# Patient Record
Sex: FEMALE | Race: BLACK OR AFRICAN AMERICAN | Employment: UNEMPLOYED | URBAN - METROPOLITAN AREA
[De-identification: names, ages, dates, MRNs, and addresses within clinical notes are randomized per-mention and may not be internally consistent; named-entity substitution may affect disease eponyms.]

---

## 2017-01-20 RX ORDER — HYDROCHLOROTHIAZIDE 25 MG/1
TABLET ORAL
Qty: 90 TABLET | Refills: 3 | Status: SHIPPED | OUTPATIENT
Start: 2017-01-20 | End: 2018-02-21 | Stop reason: SDUPTHER

## 2017-01-20 RX ORDER — TOPIRAMATE 50 MG/1
TABLET, FILM COATED ORAL
Qty: 180 TABLET | Refills: 3 | Status: SHIPPED | OUTPATIENT
Start: 2017-01-20 | End: 2017-03-29

## 2017-03-29 ENCOUNTER — OFFICE VISIT (OUTPATIENT)
Dept: PRIMARY CARE CLINIC | Age: 67
End: 2017-03-29

## 2017-03-29 VITALS
HEART RATE: 79 BPM | OXYGEN SATURATION: 95 % | BODY MASS INDEX: 59.91 KG/M2 | TEMPERATURE: 98.3 F | RESPIRATION RATE: 18 BRPM | WEIGHT: 293 LBS | DIASTOLIC BLOOD PRESSURE: 84 MMHG | SYSTOLIC BLOOD PRESSURE: 143 MMHG

## 2017-03-29 DIAGNOSIS — J45.901 ASTHMA WITH ACUTE EXACERBATION, UNSPECIFIED ASTHMA SEVERITY: Primary | ICD-10-CM

## 2017-03-29 DIAGNOSIS — E55.9 VITAMIN D DEFICIENCY: ICD-10-CM

## 2017-03-29 DIAGNOSIS — R06.02 SHORTNESS OF BREATH: ICD-10-CM

## 2017-03-29 DIAGNOSIS — E53.8 VITAMIN B 12 DEFICIENCY: ICD-10-CM

## 2017-03-29 DIAGNOSIS — E78.2 MIXED HYPERLIPIDEMIA: ICD-10-CM

## 2017-03-29 DIAGNOSIS — R73.03 PREDIABETES: ICD-10-CM

## 2017-03-29 DIAGNOSIS — M47.16 OSTEOARTHRITIS OF LUMBAR SPINE WITH MYELOPATHY: ICD-10-CM

## 2017-03-29 DIAGNOSIS — E66.01 MORBID OBESITY WITH BMI OF 50.0-59.9, ADULT (HCC): ICD-10-CM

## 2017-03-29 DIAGNOSIS — I50.32 CHRONIC DIASTOLIC CONGESTIVE HEART FAILURE (HCC): ICD-10-CM

## 2017-03-29 DIAGNOSIS — R73.9 HYPERGLYCEMIA: ICD-10-CM

## 2017-03-29 DIAGNOSIS — D53.8 ANEMIA DUE TO VITAMIN A DEFICIENCY: ICD-10-CM

## 2017-03-29 DIAGNOSIS — J20.9 ACUTE BRONCHITIS, UNSPECIFIED ORGANISM: ICD-10-CM

## 2017-03-29 DIAGNOSIS — E50.8 ANEMIA DUE TO VITAMIN A DEFICIENCY: ICD-10-CM

## 2017-03-29 LAB
A/G RATIO: 1.2 (ref 1.1–2.2)
ALBUMIN SERPL-MCNC: 4 G/DL (ref 3.4–5)
ALP BLD-CCNC: 97 U/L (ref 40–129)
ALT SERPL-CCNC: 25 U/L (ref 10–40)
ANION GAP SERPL CALCULATED.3IONS-SCNC: 17 MMOL/L (ref 3–16)
AST SERPL-CCNC: 19 U/L (ref 15–37)
BILIRUB SERPL-MCNC: <0.2 MG/DL (ref 0–1)
BUN BLDV-MCNC: 11 MG/DL (ref 7–20)
CALCIUM SERPL-MCNC: 9.6 MG/DL (ref 8.3–10.6)
CHLORIDE BLD-SCNC: 96 MMOL/L (ref 99–110)
CHOLESTEROL, TOTAL: 208 MG/DL (ref 0–199)
CO2: 28 MMOL/L (ref 21–32)
CREAT SERPL-MCNC: 0.6 MG/DL (ref 0.6–1.2)
GFR AFRICAN AMERICAN: >60
GFR NON-AFRICAN AMERICAN: >60
GLOBULIN: 3.3 G/DL
GLUCOSE BLD-MCNC: 140 MG/DL (ref 70–99)
HDLC SERPL-MCNC: 42 MG/DL (ref 40–60)
LDL CHOLESTEROL CALCULATED: 154 MG/DL
POTASSIUM SERPL-SCNC: 4.2 MMOL/L (ref 3.5–5.1)
PRO-BNP: 68 PG/ML (ref 0–124)
SODIUM BLD-SCNC: 141 MMOL/L (ref 136–145)
TOTAL PROTEIN: 7.3 G/DL (ref 6.4–8.2)
TRIGL SERPL-MCNC: 61 MG/DL (ref 0–150)
TSH REFLEX FT4: 0.95 UIU/ML (ref 0.27–4.2)
VITAMIN B-12: 440 PG/ML (ref 211–911)
VITAMIN D 25-HYDROXY: 22.6 NG/ML
VLDLC SERPL CALC-MCNC: 12 MG/DL

## 2017-03-29 PROCEDURE — 99214 OFFICE O/P EST MOD 30 MIN: CPT | Performed by: INTERNAL MEDICINE

## 2017-03-29 RX ORDER — METFORMIN HYDROCHLORIDE 500 MG/1
500 TABLET, EXTENDED RELEASE ORAL 2 TIMES DAILY
Qty: 180 TABLET | Refills: 3 | Status: SHIPPED | OUTPATIENT
Start: 2017-03-29 | End: 2017-12-22 | Stop reason: SDUPTHER

## 2017-03-29 RX ORDER — VITAMIN B COMPLEX
1 TABLET ORAL DAILY
Qty: 90 TABLET | Refills: 4 | Status: SHIPPED | OUTPATIENT
Start: 2017-03-29 | End: 2022-04-26 | Stop reason: ALTCHOICE

## 2017-03-29 RX ORDER — MONTELUKAST SODIUM 10 MG/1
10 TABLET ORAL NIGHTLY
Qty: 90 TABLET | Refills: 4 | Status: SHIPPED | OUTPATIENT
Start: 2017-03-29 | End: 2017-06-02 | Stop reason: SDUPTHER

## 2017-03-29 RX ORDER — BUDESONIDE AND FORMOTEROL FUMARATE DIHYDRATE 160; 4.5 UG/1; UG/1
2 AEROSOL RESPIRATORY (INHALATION) 2 TIMES DAILY
Qty: 3 INHALER | Refills: 4 | Status: SHIPPED | OUTPATIENT
Start: 2017-03-29 | End: 2019-02-06 | Stop reason: SDUPTHER

## 2017-03-29 RX ORDER — BENZONATATE 100 MG/1
100 CAPSULE ORAL 3 TIMES DAILY PRN
COMMUNITY
End: 2020-05-11

## 2017-03-29 RX ORDER — FLUTICASONE PROPIONATE 50 MCG
1 SPRAY, SUSPENSION (ML) NASAL DAILY
Qty: 3 BOTTLE | Refills: 3 | Status: SHIPPED | OUTPATIENT
Start: 2017-03-29 | End: 2020-09-14

## 2017-03-29 RX ORDER — PREDNISONE 10 MG/1
10 TABLET ORAL DAILY
COMMUNITY
End: 2018-09-07 | Stop reason: SDUPTHER

## 2017-03-29 RX ORDER — AZITHROMYCIN 250 MG/1
250 TABLET, FILM COATED ORAL DAILY
Qty: 10 TABLET | Refills: 0 | Status: SHIPPED | OUTPATIENT
Start: 2017-03-29 | End: 2017-04-08

## 2017-03-29 RX ORDER — ALBUTEROL SULFATE 90 UG/1
2 AEROSOL, METERED RESPIRATORY (INHALATION) EVERY 6 HOURS PRN
Qty: 1 INHALER | Refills: 3 | Status: SHIPPED | OUTPATIENT
Start: 2017-03-29 | End: 2018-09-06 | Stop reason: SDUPTHER

## 2017-03-29 RX ORDER — METHYLPREDNISOLONE 4 MG/1
TABLET ORAL
Qty: 1 KIT | Refills: 0 | Status: SHIPPED | OUTPATIENT
Start: 2017-03-29 | End: 2017-04-04

## 2017-03-30 LAB
ESTIMATED AVERAGE GLUCOSE: 137 MG/DL
HBA1C MFR BLD: 6.4 %

## 2017-04-04 DIAGNOSIS — E78.2 MIXED HYPERLIPIDEMIA: ICD-10-CM

## 2017-04-04 RX ORDER — ROSUVASTATIN CALCIUM 40 MG/1
40 TABLET, COATED ORAL NIGHTLY
Qty: 30 TABLET | Refills: 5 | Status: SHIPPED | OUTPATIENT
Start: 2017-04-04 | End: 2018-09-06 | Stop reason: SDUPTHER

## 2017-04-04 ASSESSMENT — ENCOUNTER SYMPTOMS
EYE PAIN: 0
BLOOD IN STOOL: 0
CHEST TIGHTNESS: 0
DIARRHEA: 0
ABDOMINAL DISTENTION: 0
ABDOMINAL PAIN: 0
VOMITING: 0
WHEEZING: 0
COUGH: 0
EYE DISCHARGE: 0
RECTAL PAIN: 0
CHOKING: 0
NAUSEA: 0
COLOR CHANGE: 0
SINUS PRESSURE: 0
SHORTNESS OF BREATH: 0
RHINORRHEA: 0
EYE ITCHING: 0
ANAL BLEEDING: 0
APNEA: 0
CONSTIPATION: 0
BACK PAIN: 1
PHOTOPHOBIA: 0
STRIDOR: 0
FACIAL SWELLING: 0
EYE REDNESS: 0

## 2017-04-06 ENCOUNTER — OFFICE VISIT (OUTPATIENT)
Dept: PRIMARY CARE CLINIC | Age: 67
End: 2017-04-06

## 2017-04-06 VITALS
BODY MASS INDEX: 48.82 KG/M2 | RESPIRATION RATE: 20 BRPM | WEIGHT: 293 LBS | TEMPERATURE: 99.4 F | OXYGEN SATURATION: 92 % | DIASTOLIC BLOOD PRESSURE: 65 MMHG | HEART RATE: 69 BPM | HEIGHT: 65 IN | SYSTOLIC BLOOD PRESSURE: 125 MMHG

## 2017-04-06 DIAGNOSIS — E78.2 MIXED HYPERLIPIDEMIA: ICD-10-CM

## 2017-04-06 DIAGNOSIS — E55.9 VITAMIN D DEFICIENCY: ICD-10-CM

## 2017-04-06 DIAGNOSIS — J45.909 ACUTE ASTHMA: ICD-10-CM

## 2017-04-06 DIAGNOSIS — B37.0 THRUSH: Primary | ICD-10-CM

## 2017-04-06 PROCEDURE — 99213 OFFICE O/P EST LOW 20 MIN: CPT | Performed by: INTERNAL MEDICINE

## 2017-04-06 ASSESSMENT — PATIENT HEALTH QUESTIONNAIRE - PHQ9
2. FEELING DOWN, DEPRESSED OR HOPELESS: 0
SUM OF ALL RESPONSES TO PHQ9 QUESTIONS 1 & 2: 0
1. LITTLE INTEREST OR PLEASURE IN DOING THINGS: 0
SUM OF ALL RESPONSES TO PHQ QUESTIONS 1-9: 0

## 2017-04-10 ASSESSMENT — ENCOUNTER SYMPTOMS
SHORTNESS OF BREATH: 0
BLOOD IN STOOL: 0
CONSTIPATION: 0
ANAL BLEEDING: 0
EYE PAIN: 0
NAUSEA: 0
DIARRHEA: 0
VOMITING: 0
RHINORRHEA: 0
FACIAL SWELLING: 0
ABDOMINAL PAIN: 0
CHOKING: 0
COUGH: 0
EYE REDNESS: 0
RECTAL PAIN: 0
STRIDOR: 0
PHOTOPHOBIA: 0
APNEA: 0
COLOR CHANGE: 0
EYE ITCHING: 0
WHEEZING: 0
EYE DISCHARGE: 0
SINUS PRESSURE: 0
ABDOMINAL DISTENTION: 0
CHEST TIGHTNESS: 0
BACK PAIN: 1

## 2017-06-02 DIAGNOSIS — I11.9 MALIGNANT HTN WITH HEART DISEASE, W/O CHF, W/O CHRONIC KIDNEY DISEASE: ICD-10-CM

## 2017-06-04 RX ORDER — LOSARTAN POTASSIUM 100 MG/1
TABLET ORAL
Qty: 90 TABLET | Refills: 4 | Status: SHIPPED | OUTPATIENT
Start: 2017-06-04 | End: 2017-07-10 | Stop reason: SDUPTHER

## 2017-07-05 ENCOUNTER — TELEPHONE (OUTPATIENT)
Dept: PRIMARY CARE CLINIC | Age: 67
End: 2017-07-05

## 2017-07-07 ENCOUNTER — TELEPHONE (OUTPATIENT)
Dept: PRIMARY CARE CLINIC | Age: 67
End: 2017-07-07

## 2017-07-10 ENCOUNTER — TELEPHONE (OUTPATIENT)
Dept: PRIMARY CARE CLINIC | Age: 67
End: 2017-07-10

## 2017-07-10 ENCOUNTER — OFFICE VISIT (OUTPATIENT)
Dept: PRIMARY CARE CLINIC | Age: 67
End: 2017-07-10

## 2017-07-10 VITALS
HEIGHT: 65 IN | HEART RATE: 82 BPM | DIASTOLIC BLOOD PRESSURE: 60 MMHG | RESPIRATION RATE: 18 BRPM | BODY MASS INDEX: 48.82 KG/M2 | OXYGEN SATURATION: 93 % | WEIGHT: 293 LBS | SYSTOLIC BLOOD PRESSURE: 110 MMHG

## 2017-07-10 DIAGNOSIS — E55.9 VITAMIN D DEFICIENCY: ICD-10-CM

## 2017-07-10 DIAGNOSIS — I89.0 LYMPHEDEMA: ICD-10-CM

## 2017-07-10 DIAGNOSIS — E78.2 MIXED HYPERLIPIDEMIA: ICD-10-CM

## 2017-07-10 DIAGNOSIS — R73.9 HYPERGLYCEMIA: ICD-10-CM

## 2017-07-10 DIAGNOSIS — M17.10 ARTHRITIS OF KNEE: Primary | ICD-10-CM

## 2017-07-10 DIAGNOSIS — I11.9 MALIGNANT HTN WITH HEART DISEASE, W/O CHF, W/O CHRONIC KIDNEY DISEASE: ICD-10-CM

## 2017-07-10 LAB
A/G RATIO: 1.4 (ref 1.1–2.2)
ALBUMIN SERPL-MCNC: 4.4 G/DL (ref 3.4–5)
ALP BLD-CCNC: 77 U/L (ref 40–129)
ALT SERPL-CCNC: 18 U/L (ref 10–40)
ANION GAP SERPL CALCULATED.3IONS-SCNC: 13 MMOL/L (ref 3–16)
AST SERPL-CCNC: 15 U/L (ref 15–37)
BASOPHILS ABSOLUTE: 0.1 K/UL (ref 0–0.2)
BASOPHILS RELATIVE PERCENT: 0.7 %
BILIRUB SERPL-MCNC: 0.3 MG/DL (ref 0–1)
BILIRUBIN URINE: NEGATIVE
BLOOD, URINE: NEGATIVE
BUN BLDV-MCNC: 12 MG/DL (ref 7–20)
CALCIUM SERPL-MCNC: 10 MG/DL (ref 8.3–10.6)
CHLORIDE BLD-SCNC: 99 MMOL/L (ref 99–110)
CLARITY: CLEAR
CO2: 31 MMOL/L (ref 21–32)
COLOR: YELLOW
CREAT SERPL-MCNC: 0.6 MG/DL (ref 0.6–1.2)
CREATININE URINE: 104.7 MG/DL (ref 28–259)
EOSINOPHILS ABSOLUTE: 0.2 K/UL (ref 0–0.6)
EOSINOPHILS RELATIVE PERCENT: 2.1 %
GFR AFRICAN AMERICAN: >60
GFR NON-AFRICAN AMERICAN: >60
GLOBULIN: 3.1 G/DL
GLUCOSE BLD-MCNC: 122 MG/DL (ref 70–99)
GLUCOSE URINE: NEGATIVE MG/DL
HCT VFR BLD CALC: 45.9 % (ref 36–48)
HEMOGLOBIN: 14.9 G/DL (ref 12–16)
KETONES, URINE: NEGATIVE MG/DL
LEUKOCYTE ESTERASE, URINE: NEGATIVE
LYMPHOCYTES ABSOLUTE: 2.8 K/UL (ref 1–5.1)
LYMPHOCYTES RELATIVE PERCENT: 34.6 %
MCH RBC QN AUTO: 29.6 PG (ref 26–34)
MCHC RBC AUTO-ENTMCNC: 32.4 G/DL (ref 31–36)
MCV RBC AUTO: 91.2 FL (ref 80–100)
MICROALBUMIN UR-MCNC: <1.2 MG/DL
MICROALBUMIN/CREAT UR-RTO: NORMAL MG/G (ref 0–30)
MICROSCOPIC EXAMINATION: NORMAL
MONOCYTES ABSOLUTE: 0.7 K/UL (ref 0–1.3)
MONOCYTES RELATIVE PERCENT: 8.3 %
NEUTROPHILS ABSOLUTE: 4.4 K/UL (ref 1.7–7.7)
NEUTROPHILS RELATIVE PERCENT: 54.3 %
NITRITE, URINE: NEGATIVE
PDW BLD-RTO: 13.8 % (ref 12.4–15.4)
PH UA: 7
PLATELET # BLD: 290 K/UL (ref 135–450)
PMV BLD AUTO: 9.2 FL (ref 5–10.5)
POTASSIUM SERPL-SCNC: 4.2 MMOL/L (ref 3.5–5.1)
PROTEIN UA: NEGATIVE MG/DL
RBC # BLD: 5.03 M/UL (ref 4–5.2)
SODIUM BLD-SCNC: 143 MMOL/L (ref 136–145)
SPECIFIC GRAVITY UA: 1.02
TOTAL PROTEIN: 7.5 G/DL (ref 6.4–8.2)
URINE TYPE: NORMAL
UROBILINOGEN, URINE: 0.2 E.U./DL
WBC # BLD: 8.1 K/UL (ref 4–11)

## 2017-07-10 PROCEDURE — 99214 OFFICE O/P EST MOD 30 MIN: CPT | Performed by: INTERNAL MEDICINE

## 2017-07-10 RX ORDER — DULOXETIN HYDROCHLORIDE 30 MG/1
30 CAPSULE, DELAYED RELEASE ORAL DAILY
Qty: 30 CAPSULE | Refills: 3 | Status: SHIPPED | OUTPATIENT
Start: 2017-07-10 | End: 2017-12-27 | Stop reason: SDUPTHER

## 2017-07-10 RX ORDER — LOSARTAN POTASSIUM 100 MG/1
TABLET ORAL
Qty: 90 TABLET | Refills: 4 | Status: SHIPPED | OUTPATIENT
Start: 2017-07-10 | End: 2018-09-06 | Stop reason: SDUPTHER

## 2017-07-11 LAB
ESTIMATED AVERAGE GLUCOSE: 139.9 MG/DL
HBA1C MFR BLD: 6.5 %
TSH REFLEX FT4: 1.54 UIU/ML (ref 0.27–4.2)
VITAMIN D 25-HYDROXY: 32.7 NG/ML

## 2017-07-11 ASSESSMENT — ENCOUNTER SYMPTOMS
ANAL BLEEDING: 0
WHEEZING: 0
CONSTIPATION: 0
RECTAL PAIN: 0
FACIAL SWELLING: 0
EYE REDNESS: 0
PHOTOPHOBIA: 0
STRIDOR: 0
ABDOMINAL DISTENTION: 0
COLOR CHANGE: 0
BLOOD IN STOOL: 0
NAUSEA: 0
VOMITING: 0
CHEST TIGHTNESS: 0
RHINORRHEA: 0
BACK PAIN: 1
APNEA: 0
EYE ITCHING: 0
COUGH: 0
ABDOMINAL PAIN: 0
DIARRHEA: 0
EYE DISCHARGE: 0
SINUS PRESSURE: 0
CHOKING: 0
EYE PAIN: 0
SHORTNESS OF BREATH: 0

## 2017-12-22 DIAGNOSIS — R73.03 PREDIABETES: ICD-10-CM

## 2017-12-23 RX ORDER — METFORMIN HYDROCHLORIDE 500 MG/1
TABLET, EXTENDED RELEASE ORAL
Qty: 180 TABLET | Refills: 1 | Status: SHIPPED | OUTPATIENT
Start: 2017-12-23 | End: 2018-09-06 | Stop reason: SDUPTHER

## 2018-02-16 DIAGNOSIS — M17.10 ARTHRITIS OF KNEE: ICD-10-CM

## 2018-02-16 NOTE — TELEPHONE ENCOUNTER
Requested Prescriptions     Pending Prescriptions Disp Refills    DULoxetine (CYMBALTA) 30 MG extended release capsule [Pharmacy Med Name: DULOXETINE DR 30MG CAPSULES] 90 capsule 5     Sig: TAKE 1 CAPSULE BY MOUTH DAILY TO LESSEN ARTHIRITIS PAIN     Last Fill Date:  12/28/2017  R:5  Last OV: 7/10/17

## 2018-02-21 RX ORDER — DULOXETIN HYDROCHLORIDE 30 MG/1
CAPSULE, DELAYED RELEASE ORAL
Qty: 90 CAPSULE | Refills: 5 | Status: SHIPPED | OUTPATIENT
Start: 2018-02-21 | End: 2018-09-06 | Stop reason: SDUPTHER

## 2018-02-22 RX ORDER — HYDROCHLOROTHIAZIDE 25 MG/1
TABLET ORAL
Qty: 90 TABLET | Refills: 3 | Status: SHIPPED | OUTPATIENT
Start: 2018-02-22 | End: 2019-02-06 | Stop reason: SDUPTHER

## 2018-09-06 DIAGNOSIS — M17.10 ARTHRITIS OF KNEE: ICD-10-CM

## 2018-09-06 DIAGNOSIS — R73.03 PREDIABETES: ICD-10-CM

## 2018-09-06 DIAGNOSIS — E78.2 MIXED HYPERLIPIDEMIA: ICD-10-CM

## 2018-09-06 DIAGNOSIS — J45.40 MODERATE PERSISTENT ASTHMA WITHOUT COMPLICATION: ICD-10-CM

## 2018-09-06 DIAGNOSIS — I11.9 MALIGNANT HTN WITH HEART DISEASE, W/O CHF, W/O CHRONIC KIDNEY DISEASE: ICD-10-CM

## 2018-09-06 RX ORDER — ALBUTEROL SULFATE 90 UG/1
2 AEROSOL, METERED RESPIRATORY (INHALATION) EVERY 6 HOURS PRN
Qty: 1 INHALER | Refills: 3 | Status: SHIPPED | OUTPATIENT
Start: 2018-09-06 | End: 2018-09-07 | Stop reason: SDUPTHER

## 2018-09-06 RX ORDER — AMLODIPINE BESYLATE 10 MG/1
TABLET ORAL
Qty: 90 TABLET | Refills: 4 | Status: SHIPPED | OUTPATIENT
Start: 2018-09-06 | End: 2019-02-06 | Stop reason: SDUPTHER

## 2018-09-06 RX ORDER — NITROGLYCERIN 0.4 MG/1
0.4 TABLET SUBLINGUAL EVERY 5 MIN PRN
Qty: 25 TABLET | Refills: 3 | Status: SHIPPED | OUTPATIENT
Start: 2018-09-06 | End: 2018-09-07 | Stop reason: SDUPTHER

## 2018-09-06 RX ORDER — DULOXETIN HYDROCHLORIDE 30 MG/1
CAPSULE, DELAYED RELEASE ORAL
Qty: 90 CAPSULE | Refills: 5 | Status: SHIPPED | OUTPATIENT
Start: 2018-09-06 | End: 2019-05-13

## 2018-09-06 RX ORDER — LOSARTAN POTASSIUM 100 MG/1
TABLET ORAL
Qty: 90 TABLET | Refills: 4 | Status: SHIPPED | OUTPATIENT
Start: 2018-09-06 | End: 2019-02-06 | Stop reason: SDUPTHER

## 2018-09-06 RX ORDER — METFORMIN HYDROCHLORIDE 500 MG/1
TABLET, EXTENDED RELEASE ORAL
Qty: 180 TABLET | Refills: 1 | Status: SHIPPED | OUTPATIENT
Start: 2018-09-06 | End: 2019-02-06 | Stop reason: SDUPTHER

## 2018-09-06 RX ORDER — ROSUVASTATIN CALCIUM 40 MG/1
40 TABLET, COATED ORAL NIGHTLY
Qty: 30 TABLET | Refills: 5 | Status: SHIPPED | OUTPATIENT
Start: 2018-09-06 | End: 2019-02-06 | Stop reason: SDUPTHER

## 2018-09-07 DIAGNOSIS — R07.9 CHEST PAIN: ICD-10-CM

## 2018-09-07 DIAGNOSIS — I11.9 MALIGNANT HTN WITH HEART DISEASE, W/O CHF, W/O CHRONIC KIDNEY DISEASE: ICD-10-CM

## 2018-09-07 DIAGNOSIS — J45.40 MODERATE PERSISTENT ASTHMA: ICD-10-CM

## 2018-09-07 DIAGNOSIS — J45.40 MODERATE PERSISTENT ASTHMA WITHOUT COMPLICATION: ICD-10-CM

## 2018-09-07 DIAGNOSIS — E78.2 MIXED HYPERLIPIDEMIA: ICD-10-CM

## 2018-09-07 DIAGNOSIS — R73.03 PREDIABETES: ICD-10-CM

## 2018-09-07 DIAGNOSIS — M17.10 ARTHRITIS OF KNEE: ICD-10-CM

## 2018-09-08 RX ORDER — LOSARTAN POTASSIUM 100 MG/1
TABLET ORAL
Qty: 90 TABLET | Refills: 4 | OUTPATIENT
Start: 2018-09-08

## 2018-09-08 RX ORDER — ALBUTEROL SULFATE 90 UG/1
2 AEROSOL, METERED RESPIRATORY (INHALATION) EVERY 6 HOURS PRN
Qty: 1 INHALER | Refills: 3 | OUTPATIENT
Start: 2018-09-08

## 2018-09-08 RX ORDER — NITROGLYCERIN 0.4 MG/1
0.4 TABLET SUBLINGUAL EVERY 5 MIN PRN
Qty: 25 TABLET | Refills: 3 | Status: SHIPPED | OUTPATIENT
Start: 2018-09-08 | End: 2018-09-08

## 2018-09-08 RX ORDER — DULOXETIN HYDROCHLORIDE 30 MG/1
30 CAPSULE, DELAYED RELEASE ORAL DAILY
Qty: 90 CAPSULE | Refills: 0 | OUTPATIENT
Start: 2018-09-08

## 2018-09-08 RX ORDER — NITROGLYCERIN 0.4 MG/1
0.4 TABLET SUBLINGUAL EVERY 5 MIN PRN
Qty: 25 TABLET | Refills: 3 | Status: SHIPPED | OUTPATIENT
Start: 2018-09-08 | End: 2018-09-11

## 2018-09-08 RX ORDER — AMLODIPINE BESYLATE 10 MG/1
10 TABLET ORAL DAILY
Qty: 90 TABLET | Refills: 4 | OUTPATIENT
Start: 2018-09-08

## 2018-09-08 RX ORDER — ROSUVASTATIN CALCIUM 40 MG/1
40 TABLET, COATED ORAL NIGHTLY
Qty: 30 TABLET | Refills: 5 | OUTPATIENT
Start: 2018-09-08

## 2018-09-08 RX ORDER — ALBUTEROL SULFATE 90 UG/1
2 AEROSOL, METERED RESPIRATORY (INHALATION) EVERY 6 HOURS PRN
Qty: 1 INHALER | Refills: 3 | Status: SHIPPED | OUTPATIENT
Start: 2018-09-08 | End: 2020-05-11 | Stop reason: SDUPTHER

## 2018-09-08 RX ORDER — ALBUTEROL SULFATE 90 UG/1
2 AEROSOL, METERED RESPIRATORY (INHALATION) EVERY 6 HOURS PRN
Qty: 1 INHALER | Refills: 3 | Status: SHIPPED | OUTPATIENT
Start: 2018-09-08 | End: 2020-09-15

## 2018-09-08 RX ORDER — DULOXETIN HYDROCHLORIDE 30 MG/1
CAPSULE, DELAYED RELEASE ORAL
Qty: 90 CAPSULE | Refills: 5 | OUTPATIENT
Start: 2018-09-08

## 2018-09-08 RX ORDER — METFORMIN HYDROCHLORIDE 500 MG/1
TABLET, EXTENDED RELEASE ORAL
Qty: 180 TABLET | Refills: 1 | OUTPATIENT
Start: 2018-09-08

## 2018-09-08 RX ORDER — PREDNISONE 10 MG/1
10 TABLET ORAL DAILY
Qty: 1 TABLET | Refills: 5 | Status: SHIPPED | OUTPATIENT
Start: 2018-09-08 | End: 2019-02-06

## 2018-09-11 RX ORDER — NITROGLYCERIN 0.4 MG/1
TABLET SUBLINGUAL
Qty: 75 TABLET | Refills: 1 | Status: SHIPPED | OUTPATIENT
Start: 2018-09-11 | End: 2021-08-17

## 2019-01-28 ENCOUNTER — TELEPHONE (OUTPATIENT)
Dept: PRIMARY CARE CLINIC | Age: 69
End: 2019-01-28

## 2019-02-06 ENCOUNTER — OFFICE VISIT (OUTPATIENT)
Dept: PRIMARY CARE CLINIC | Age: 69
End: 2019-02-06
Payer: MEDICARE

## 2019-02-06 VITALS
DIASTOLIC BLOOD PRESSURE: 96 MMHG | BODY MASS INDEX: 58.24 KG/M2 | OXYGEN SATURATION: 91 % | WEIGHT: 293 LBS | RESPIRATION RATE: 18 BRPM | SYSTOLIC BLOOD PRESSURE: 160 MMHG | TEMPERATURE: 97.8 F | HEART RATE: 77 BPM

## 2019-02-06 DIAGNOSIS — E78.2 MIXED HYPERLIPIDEMIA: ICD-10-CM

## 2019-02-06 DIAGNOSIS — R73.01 ELEVATED FASTING GLUCOSE: ICD-10-CM

## 2019-02-06 DIAGNOSIS — R73.03 PREDIABETES: ICD-10-CM

## 2019-02-06 DIAGNOSIS — J45.41 MODERATE PERSISTENT ASTHMA WITH ACUTE EXACERBATION: Primary | ICD-10-CM

## 2019-02-06 DIAGNOSIS — R53.83 OTHER FATIGUE: ICD-10-CM

## 2019-02-06 DIAGNOSIS — R06.09 DOE (DYSPNEA ON EXERTION): ICD-10-CM

## 2019-02-06 DIAGNOSIS — I11.9 MALIGNANT HTN WITH HEART DISEASE, W/O CHF, W/O CHRONIC KIDNEY DISEASE: ICD-10-CM

## 2019-02-06 DIAGNOSIS — Z12.11 COLON CANCER SCREENING: ICD-10-CM

## 2019-02-06 DIAGNOSIS — R63.4 WEIGHT LOSS: ICD-10-CM

## 2019-02-06 DIAGNOSIS — Z12.31 ENCOUNTER FOR SCREENING MAMMOGRAM FOR BREAST CANCER: ICD-10-CM

## 2019-02-06 DIAGNOSIS — E55.9 VITAMIN D DEFICIENCY: ICD-10-CM

## 2019-02-06 DIAGNOSIS — M25.511 CHRONIC RIGHT SHOULDER PAIN: ICD-10-CM

## 2019-02-06 DIAGNOSIS — G89.29 CHRONIC RIGHT SHOULDER PAIN: ICD-10-CM

## 2019-02-06 DIAGNOSIS — J45.41 MODERATE PERSISTENT ASTHMA WITH ACUTE EXACERBATION: ICD-10-CM

## 2019-02-06 LAB
BILIRUBIN URINE: NEGATIVE
BLOOD, URINE: NEGATIVE
CHOLESTEROL, TOTAL: 271 MG/DL (ref 0–199)
CLARITY: CLEAR
COLOR: YELLOW
GLUCOSE URINE: NEGATIVE MG/DL
HDLC SERPL-MCNC: 44 MG/DL (ref 40–60)
KETONES, URINE: NEGATIVE MG/DL
LDL CHOLESTEROL CALCULATED: 206 MG/DL
LEUKOCYTE ESTERASE, URINE: NEGATIVE
LIPASE: 19 U/L (ref 13–60)
MICROSCOPIC EXAMINATION: NORMAL
NITRITE, URINE: NEGATIVE
PH UA: 6
PROTEIN UA: NEGATIVE MG/DL
SPECIFIC GRAVITY UA: 1.02
TRIGL SERPL-MCNC: 107 MG/DL (ref 0–150)
TSH REFLEX FT4: 2.12 UIU/ML (ref 0.27–4.2)
URINE TYPE: NORMAL
UROBILINOGEN, URINE: 0.2 E.U./DL
VITAMIN B-12: 367 PG/ML (ref 211–911)
VITAMIN D 25-HYDROXY: 28.9 NG/ML
VLDLC SERPL CALC-MCNC: 21 MG/DL

## 2019-02-06 PROCEDURE — G8400 PT W/DXA NO RESULTS DOC: HCPCS | Performed by: INTERNAL MEDICINE

## 2019-02-06 PROCEDURE — 1036F TOBACCO NON-USER: CPT | Performed by: INTERNAL MEDICINE

## 2019-02-06 PROCEDURE — 4040F PNEUMOC VAC/ADMIN/RCVD: CPT | Performed by: INTERNAL MEDICINE

## 2019-02-06 PROCEDURE — 3017F COLORECTAL CA SCREEN DOC REV: CPT | Performed by: INTERNAL MEDICINE

## 2019-02-06 PROCEDURE — G8484 FLU IMMUNIZE NO ADMIN: HCPCS | Performed by: INTERNAL MEDICINE

## 2019-02-06 PROCEDURE — G8417 CALC BMI ABV UP PARAM F/U: HCPCS | Performed by: INTERNAL MEDICINE

## 2019-02-06 PROCEDURE — 1090F PRES/ABSN URINE INCON ASSESS: CPT | Performed by: INTERNAL MEDICINE

## 2019-02-06 PROCEDURE — G8427 DOCREV CUR MEDS BY ELIG CLIN: HCPCS | Performed by: INTERNAL MEDICINE

## 2019-02-06 PROCEDURE — 99214 OFFICE O/P EST MOD 30 MIN: CPT | Performed by: INTERNAL MEDICINE

## 2019-02-06 PROCEDURE — 1101F PT FALLS ASSESS-DOCD LE1/YR: CPT | Performed by: INTERNAL MEDICINE

## 2019-02-06 PROCEDURE — 1123F ACP DISCUSS/DSCN MKR DOCD: CPT | Performed by: INTERNAL MEDICINE

## 2019-02-06 RX ORDER — ALBUTEROL SULFATE 2.5 MG/3ML
2.5 SOLUTION RESPIRATORY (INHALATION) EVERY 6 HOURS PRN
Qty: 120 EACH | Refills: 3 | Status: SHIPPED | OUTPATIENT
Start: 2019-02-06

## 2019-02-06 RX ORDER — BUDESONIDE AND FORMOTEROL FUMARATE DIHYDRATE 160; 4.5 UG/1; UG/1
2 AEROSOL RESPIRATORY (INHALATION) 2 TIMES DAILY
Qty: 1 INHALER | Refills: 11 | Status: SHIPPED | OUTPATIENT
Start: 2019-02-06 | End: 2020-05-11 | Stop reason: SDUPTHER

## 2019-02-06 RX ORDER — LOSARTAN POTASSIUM 100 MG/1
TABLET ORAL
Qty: 90 TABLET | Refills: 4 | Status: SHIPPED | OUTPATIENT
Start: 2019-02-06 | End: 2020-03-13

## 2019-02-06 RX ORDER — ROSUVASTATIN CALCIUM 40 MG/1
40 TABLET, COATED ORAL NIGHTLY
Qty: 30 TABLET | Refills: 5 | Status: SHIPPED | OUTPATIENT
Start: 2019-02-06 | End: 2019-02-06 | Stop reason: SDUPTHER

## 2019-02-06 RX ORDER — RANITIDINE 150 MG/1
150 TABLET ORAL 2 TIMES DAILY
Qty: 60 TABLET | Refills: 3 | Status: SHIPPED | OUTPATIENT
Start: 2019-02-06 | End: 2019-02-06 | Stop reason: SDUPTHER

## 2019-02-06 RX ORDER — METFORMIN HYDROCHLORIDE 500 MG/1
TABLET, EXTENDED RELEASE ORAL
Qty: 180 TABLET | Refills: 1 | Status: SHIPPED | OUTPATIENT
Start: 2019-02-06 | End: 2019-05-14

## 2019-02-06 RX ORDER — AMLODIPINE BESYLATE 10 MG/1
TABLET ORAL
Qty: 90 TABLET | Refills: 4 | Status: SHIPPED | OUTPATIENT
Start: 2019-02-06 | End: 2020-03-13

## 2019-02-06 RX ORDER — NEBULIZER ACCESSORIES
1 KIT MISCELLANEOUS 4 TIMES DAILY PRN
Qty: 1 KIT | Refills: 5 | Status: SHIPPED | OUTPATIENT
Start: 2019-02-06 | End: 2021-08-17

## 2019-02-06 RX ORDER — HYDROCHLOROTHIAZIDE 25 MG/1
TABLET ORAL
Qty: 90 TABLET | Refills: 3 | Status: SHIPPED | OUTPATIENT
Start: 2019-02-06 | End: 2020-05-11 | Stop reason: SDUPTHER

## 2019-02-06 RX ORDER — MONTELUKAST SODIUM 10 MG/1
10 TABLET ORAL DAILY
Qty: 30 TABLET | Refills: 11 | Status: SHIPPED | OUTPATIENT
Start: 2019-02-06 | End: 2019-02-06 | Stop reason: SDUPTHER

## 2019-02-06 ASSESSMENT — ENCOUNTER SYMPTOMS
SORE THROAT: 0
COUGH: 1
WHEEZING: 1
HEMOPTYSIS: 0
SHORTNESS OF BREATH: 1
CHEST TIGHTNESS: 1
HOARSE VOICE: 0
DIFFICULTY BREATHING: 1

## 2019-02-06 ASSESSMENT — PATIENT HEALTH QUESTIONNAIRE - PHQ9
SUM OF ALL RESPONSES TO PHQ QUESTIONS 1-9: 0
SUM OF ALL RESPONSES TO PHQ QUESTIONS 1-9: 0
2. FEELING DOWN, DEPRESSED OR HOPELESS: 0
1. LITTLE INTEREST OR PLEASURE IN DOING THINGS: 0
SUM OF ALL RESPONSES TO PHQ9 QUESTIONS 1 & 2: 0

## 2019-02-07 DIAGNOSIS — E55.9 VITAMIN D DEFICIENCY: Primary | ICD-10-CM

## 2019-02-07 LAB
ESTIMATED AVERAGE GLUCOSE: 145.6 MG/DL
HBA1C MFR BLD: 6.7 %

## 2019-02-07 RX ORDER — MONTELUKAST SODIUM 10 MG/1
10 TABLET ORAL DAILY
Qty: 90 TABLET | Refills: 11 | Status: SHIPPED | OUTPATIENT
Start: 2019-02-07 | End: 2020-05-11 | Stop reason: SDUPTHER

## 2019-02-07 RX ORDER — RANITIDINE 150 MG/1
TABLET ORAL
Qty: 180 TABLET | Refills: 3 | Status: SHIPPED | OUTPATIENT
Start: 2019-02-07 | End: 2020-05-11

## 2019-02-07 RX ORDER — ROSUVASTATIN CALCIUM 40 MG/1
TABLET, COATED ORAL
Qty: 90 TABLET | Refills: 5 | Status: SHIPPED | OUTPATIENT
Start: 2019-02-07 | End: 2020-03-19 | Stop reason: SDUPTHER

## 2019-02-17 ASSESSMENT — ENCOUNTER SYMPTOMS
BACK PAIN: 1
ABDOMINAL DISTENTION: 0
BLURRED VISION: 0
CHOKING: 0
SINUS PRESSURE: 0
EYE ITCHING: 0
STRIDOR: 0
CHEST TIGHTNESS: 0
ABDOMINAL PAIN: 0
COLOR CHANGE: 0
APNEA: 0
PHOTOPHOBIA: 0
EYE DISCHARGE: 0
ANAL BLEEDING: 0
RHINORRHEA: 0
NAUSEA: 0
CONSTIPATION: 0
BLOOD IN STOOL: 0
EYE REDNESS: 0
VOMITING: 0
FACIAL SWELLING: 0
EYE PAIN: 0
RECTAL PAIN: 0
DIARRHEA: 0

## 2019-04-05 RX ORDER — BACLOFEN 10 MG/1
TABLET ORAL
Qty: 40 TABLET | Refills: 0 | Status: SHIPPED | OUTPATIENT
Start: 2019-04-05 | End: 2020-05-11 | Stop reason: ALTCHOICE

## 2019-04-08 ENCOUNTER — TELEPHONE (OUTPATIENT)
Dept: PRIMARY CARE CLINIC | Age: 69
End: 2019-04-08

## 2019-04-08 DIAGNOSIS — M17.10 ARTHRITIS OF KNEE: ICD-10-CM

## 2019-04-10 RX ORDER — DULOXETIN HYDROCHLORIDE 30 MG/1
CAPSULE, DELAYED RELEASE ORAL
Qty: 30 CAPSULE | Refills: 2 | Status: SHIPPED | OUTPATIENT
Start: 2019-04-10 | End: 2019-05-13

## 2019-04-19 PROBLEM — M16.11 OSTEOARTHRITIS OF RIGHT HIP: Status: ACTIVE | Noted: 2019-04-19

## 2019-05-13 ENCOUNTER — OFFICE VISIT (OUTPATIENT)
Dept: PRIMARY CARE CLINIC | Age: 69
End: 2019-05-13
Payer: MEDICARE

## 2019-05-13 VITALS
HEART RATE: 100 BPM | WEIGHT: 293 LBS | SYSTOLIC BLOOD PRESSURE: 154 MMHG | OXYGEN SATURATION: 94 % | RESPIRATION RATE: 18 BRPM | BODY MASS INDEX: 59.24 KG/M2 | TEMPERATURE: 100.2 F | DIASTOLIC BLOOD PRESSURE: 94 MMHG

## 2019-05-13 DIAGNOSIS — R35.0 URINARY FREQUENCY: ICD-10-CM

## 2019-05-13 DIAGNOSIS — M16.0 BILATERAL HIP JOINT ARTHRITIS: ICD-10-CM

## 2019-05-13 DIAGNOSIS — J45.50 SEVERE PERSISTENT ASTHMA WITHOUT COMPLICATION: ICD-10-CM

## 2019-05-13 DIAGNOSIS — Z12.31 ENCOUNTER FOR SCREENING MAMMOGRAM FOR BREAST CANCER: ICD-10-CM

## 2019-05-13 DIAGNOSIS — E55.9 VITAMIN D DEFICIENCY: ICD-10-CM

## 2019-05-13 DIAGNOSIS — R73.03 PREDIABETES: ICD-10-CM

## 2019-05-13 DIAGNOSIS — E78.2 MIXED HYPERLIPIDEMIA: ICD-10-CM

## 2019-05-13 DIAGNOSIS — Z00.00 ENCOUNTER FOR PREVENTIVE HEALTH EXAMINATION: Primary | ICD-10-CM

## 2019-05-13 DIAGNOSIS — R13.19 ESOPHAGEAL DYSPHAGIA: ICD-10-CM

## 2019-05-13 DIAGNOSIS — M17.10 ARTHRITIS OF KNEE: ICD-10-CM

## 2019-05-13 LAB
CHOLESTEROL, TOTAL: 190 MG/DL (ref 0–199)
HDLC SERPL-MCNC: 40 MG/DL (ref 40–60)
LDL CHOLESTEROL CALCULATED: 127 MG/DL
TRIGL SERPL-MCNC: 114 MG/DL (ref 0–150)
VLDLC SERPL CALC-MCNC: 23 MG/DL

## 2019-05-13 PROCEDURE — 99404 PREV MED CNSL INDIV APPRX 60: CPT | Performed by: INTERNAL MEDICINE

## 2019-05-13 RX ORDER — DULOXETIN HYDROCHLORIDE 60 MG/1
60 CAPSULE, DELAYED RELEASE ORAL DAILY
Qty: 30 CAPSULE | Refills: 5 | Status: SHIPPED | OUTPATIENT
Start: 2019-05-13 | End: 2019-05-13 | Stop reason: SDUPTHER

## 2019-05-13 RX ORDER — DULOXETIN HYDROCHLORIDE 60 MG/1
60 CAPSULE, DELAYED RELEASE ORAL DAILY
Qty: 90 CAPSULE | Refills: 3 | Status: SHIPPED | OUTPATIENT
Start: 2019-05-13 | End: 2019-05-14

## 2019-05-13 ASSESSMENT — ENCOUNTER SYMPTOMS
CHEST TIGHTNESS: 0
STRIDOR: 0
CHOKING: 0
COUGH: 0
DIARRHEA: 0
EYE DISCHARGE: 0
NAUSEA: 0
SHORTNESS OF BREATH: 0
COLOR CHANGE: 0
WHEEZING: 0
BLOOD IN STOOL: 0
SINUS PRESSURE: 0
BACK PAIN: 1
VOMITING: 0
PHOTOPHOBIA: 0
FACIAL SWELLING: 0
ANAL BLEEDING: 0
CONSTIPATION: 0
EYE ITCHING: 0
EYE PAIN: 0
ABDOMINAL PAIN: 0
RHINORRHEA: 0
RECTAL PAIN: 0
ABDOMINAL DISTENTION: 0
APNEA: 0
EYE REDNESS: 0

## 2019-05-13 ASSESSMENT — PATIENT HEALTH QUESTIONNAIRE - PHQ9
SUM OF ALL RESPONSES TO PHQ9 QUESTIONS 1 & 2: 2
SUM OF ALL RESPONSES TO PHQ QUESTIONS 1-9: 2
SUM OF ALL RESPONSES TO PHQ QUESTIONS 1-9: 2
1. LITTLE INTEREST OR PLEASURE IN DOING THINGS: 1
2. FEELING DOWN, DEPRESSED OR HOPELESS: 1
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
## 2019-05-13 NOTE — PATIENT INSTRUCTIONS
Nutritional Supplements for Arthritis  Krill oil daily  If allergic to fish , then Flax Seed Oil 1,000 mg three times daily. Tumeric 500 mg daily with dinner seasoned with black pepper. Black pepper improves absorption of tumeric. Black Current Seed Oil, 500 mg daily. Like Vitamin E, these supplement are mild blood thinner, so stop them one week prior to any surgical procedure. Chandra Downey

## 2019-05-13 NOTE — LETTER
[address]  [address]  Phone: [phone]  Fax: [phone]    Forrest Taylor MD        May 13, 2019     Patient: Drew Murguia   YOB: 1950   Date of Visit: 5/13/2019       To pharmacy:    Please give Martacarlos Davis Pneumovax 23, T dap and Shingrix. If you have any questions or concerns, please don't hesitate to call.     Sincerely,          Forrest Taylor MD

## 2019-05-14 DIAGNOSIS — M17.10 ARTHRITIS OF KNEE: ICD-10-CM

## 2019-05-14 DIAGNOSIS — E11.9 TYPE 2 DIABETES MELLITUS WITHOUT COMPLICATION, WITHOUT LONG-TERM CURRENT USE OF INSULIN (HCC): ICD-10-CM

## 2019-05-14 DIAGNOSIS — R73.03 PREDIABETES: Primary | ICD-10-CM

## 2019-05-14 LAB
ESTIMATED AVERAGE GLUCOSE: 145.6 MG/DL
HBA1C MFR BLD: 6.7 %

## 2019-05-14 RX ORDER — DULOXETIN HYDROCHLORIDE 60 MG/1
60 CAPSULE, DELAYED RELEASE ORAL DAILY
Qty: 90 CAPSULE | Refills: 3 | Status: SHIPPED | OUTPATIENT
Start: 2019-05-14 | End: 2019-05-16

## 2019-09-23 ENCOUNTER — TELEPHONE (OUTPATIENT)
Dept: PRIMARY CARE CLINIC | Age: 69
End: 2019-09-23

## 2019-11-12 ENCOUNTER — TELEPHONE (OUTPATIENT)
Dept: PRIMARY CARE CLINIC | Age: 69
End: 2019-11-12

## 2019-11-13 DIAGNOSIS — R06.83 SNORING: Primary | ICD-10-CM

## 2020-03-13 ENCOUNTER — TELEPHONE (OUTPATIENT)
Dept: PRIMARY CARE CLINIC | Age: 70
End: 2020-03-13

## 2020-03-16 RX ORDER — RANITIDINE 150 MG/1
TABLET ORAL
Qty: 180 TABLET | Refills: 3 | Status: CANCELLED | OUTPATIENT
Start: 2020-03-16

## 2020-03-16 RX ORDER — NITROGLYCERIN 0.4 MG/1
TABLET SUBLINGUAL
Qty: 75 TABLET | Refills: 1 | Status: CANCELLED | OUTPATIENT
Start: 2020-03-16

## 2020-03-16 RX ORDER — ALBUTEROL SULFATE 2.5 MG/3ML
2.5 SOLUTION RESPIRATORY (INHALATION) EVERY 6 HOURS PRN
Qty: 120 EACH | Refills: 3 | Status: CANCELLED | OUTPATIENT
Start: 2020-03-16

## 2020-03-16 RX ORDER — FLUTICASONE PROPIONATE 50 MCG
1 SPRAY, SUSPENSION (ML) NASAL DAILY
Qty: 3 BOTTLE | Refills: 3 | Status: CANCELLED | OUTPATIENT
Start: 2020-03-16

## 2020-03-16 RX ORDER — LOSARTAN POTASSIUM 100 MG/1
TABLET ORAL
Qty: 30 TABLET | Refills: 0 | Status: CANCELLED | OUTPATIENT
Start: 2020-03-16

## 2020-03-16 RX ORDER — DULOXETIN HYDROCHLORIDE 60 MG/1
60 CAPSULE, DELAYED RELEASE ORAL DAILY
Qty: 90 CAPSULE | Refills: 1 | Status: CANCELLED | OUTPATIENT
Start: 2020-03-16

## 2020-03-16 RX ORDER — BUDESONIDE AND FORMOTEROL FUMARATE DIHYDRATE 160; 4.5 UG/1; UG/1
2 AEROSOL RESPIRATORY (INHALATION) 2 TIMES DAILY
Qty: 1 INHALER | Refills: 11 | Status: CANCELLED | OUTPATIENT
Start: 2020-03-16

## 2020-03-16 RX ORDER — VITAMIN B COMPLEX
1 TABLET ORAL DAILY
Qty: 90 TABLET | Refills: 4 | Status: CANCELLED | OUTPATIENT
Start: 2020-03-16

## 2020-03-16 RX ORDER — BACLOFEN 10 MG/1
TABLET ORAL
Qty: 40 TABLET | Refills: 0 | Status: CANCELLED | OUTPATIENT
Start: 2020-03-16

## 2020-03-16 RX ORDER — AMLODIPINE BESYLATE 10 MG/1
TABLET ORAL
Qty: 30 TABLET | Refills: 0 | Status: CANCELLED | OUTPATIENT
Start: 2020-03-16

## 2020-03-16 RX ORDER — NEBULIZER ACCESSORIES
1 KIT MISCELLANEOUS 4 TIMES DAILY PRN
Qty: 1 KIT | Refills: 5 | Status: CANCELLED | OUTPATIENT
Start: 2020-03-16

## 2020-03-16 RX ORDER — ALBUTEROL SULFATE 90 UG/1
2 AEROSOL, METERED RESPIRATORY (INHALATION) EVERY 6 HOURS PRN
Qty: 1 INHALER | Refills: 3 | Status: CANCELLED | OUTPATIENT
Start: 2020-03-16

## 2020-03-16 RX ORDER — HYDROCHLOROTHIAZIDE 25 MG/1
TABLET ORAL
Qty: 90 TABLET | Refills: 3 | Status: CANCELLED | OUTPATIENT
Start: 2020-03-16

## 2020-03-16 RX ORDER — MONTELUKAST SODIUM 10 MG/1
10 TABLET ORAL DAILY
Qty: 90 TABLET | Refills: 11 | Status: CANCELLED | OUTPATIENT
Start: 2020-03-16

## 2020-03-16 RX ORDER — ROSUVASTATIN CALCIUM 40 MG/1
TABLET, COATED ORAL
Qty: 90 TABLET | Refills: 5 | Status: CANCELLED | OUTPATIENT
Start: 2020-03-16

## 2020-03-19 RX ORDER — ROSUVASTATIN CALCIUM 40 MG/1
TABLET, COATED ORAL
Qty: 90 TABLET | Refills: 0 | Status: SHIPPED | OUTPATIENT
Start: 2020-03-19 | End: 2020-05-11 | Stop reason: SDUPTHER

## 2020-03-19 NOTE — TELEPHONE ENCOUNTER
Patient called in stating she needs her medication and would like to speak to Dr. Alejandra Gloria. Please advise.

## 2020-05-11 ENCOUNTER — VIRTUAL VISIT (OUTPATIENT)
Dept: PRIMARY CARE CLINIC | Age: 70
End: 2020-05-11
Payer: MEDICARE

## 2020-05-11 ENCOUNTER — TELEPHONE (OUTPATIENT)
Dept: PRIMARY CARE CLINIC | Age: 70
End: 2020-05-11

## 2020-05-11 VITALS
TEMPERATURE: 96.5 F | HEART RATE: 63 BPM | SYSTOLIC BLOOD PRESSURE: 121 MMHG | BODY MASS INDEX: 50.02 KG/M2 | DIASTOLIC BLOOD PRESSURE: 64 MMHG | HEIGHT: 64 IN | WEIGHT: 293 LBS

## 2020-05-11 PROCEDURE — 3017F COLORECTAL CA SCREEN DOC REV: CPT | Performed by: INTERNAL MEDICINE

## 2020-05-11 PROCEDURE — 1123F ACP DISCUSS/DSCN MKR DOCD: CPT | Performed by: INTERNAL MEDICINE

## 2020-05-11 PROCEDURE — 99214 OFFICE O/P EST MOD 30 MIN: CPT | Performed by: INTERNAL MEDICINE

## 2020-05-11 PROCEDURE — G8400 PT W/DXA NO RESULTS DOC: HCPCS | Performed by: INTERNAL MEDICINE

## 2020-05-11 PROCEDURE — 1090F PRES/ABSN URINE INCON ASSESS: CPT | Performed by: INTERNAL MEDICINE

## 2020-05-11 PROCEDURE — 2022F DILAT RTA XM EVC RTNOPTHY: CPT | Performed by: INTERNAL MEDICINE

## 2020-05-11 PROCEDURE — G8428 CUR MEDS NOT DOCUMENT: HCPCS | Performed by: INTERNAL MEDICINE

## 2020-05-11 PROCEDURE — 1036F TOBACCO NON-USER: CPT | Performed by: INTERNAL MEDICINE

## 2020-05-11 PROCEDURE — G8417 CALC BMI ABV UP PARAM F/U: HCPCS | Performed by: INTERNAL MEDICINE

## 2020-05-11 PROCEDURE — 3046F HEMOGLOBIN A1C LEVEL >9.0%: CPT | Performed by: INTERNAL MEDICINE

## 2020-05-11 PROCEDURE — 4040F PNEUMOC VAC/ADMIN/RCVD: CPT | Performed by: INTERNAL MEDICINE

## 2020-05-11 RX ORDER — LOSARTAN POTASSIUM 100 MG/1
100 TABLET ORAL DAILY
Qty: 90 TABLET | Refills: 3 | Status: SHIPPED | OUTPATIENT
Start: 2020-05-11 | End: 2021-08-17 | Stop reason: SDUPTHER

## 2020-05-11 RX ORDER — MONTELUKAST SODIUM 10 MG/1
10 TABLET ORAL DAILY
Qty: 90 TABLET | Refills: 3 | Status: SHIPPED | OUTPATIENT
Start: 2020-05-11 | End: 2021-08-17 | Stop reason: SDUPTHER

## 2020-05-11 RX ORDER — ROSUVASTATIN CALCIUM 40 MG/1
40 TABLET, COATED ORAL DAILY
Qty: 90 TABLET | Refills: 3 | Status: SHIPPED | OUTPATIENT
Start: 2020-05-11 | End: 2021-08-17 | Stop reason: SDUPTHER

## 2020-05-11 RX ORDER — AMLODIPINE BESYLATE 10 MG/1
10 TABLET ORAL DAILY
Qty: 90 TABLET | Refills: 3 | Status: SHIPPED | OUTPATIENT
Start: 2020-05-11 | End: 2021-08-17 | Stop reason: SDUPTHER

## 2020-05-11 RX ORDER — ALBUTEROL SULFATE 90 UG/1
2 AEROSOL, METERED RESPIRATORY (INHALATION) EVERY 6 HOURS PRN
Qty: 3 INHALER | Refills: 3 | Status: SHIPPED | OUTPATIENT
Start: 2020-05-11 | End: 2021-08-17 | Stop reason: SDUPTHER

## 2020-05-11 RX ORDER — HYDROCHLOROTHIAZIDE 25 MG/1
TABLET ORAL
Qty: 90 TABLET | Refills: 3 | Status: SHIPPED | OUTPATIENT
Start: 2020-05-11 | End: 2021-08-17 | Stop reason: SDUPTHER

## 2020-05-11 RX ORDER — BUDESONIDE AND FORMOTEROL FUMARATE DIHYDRATE 160; 4.5 UG/1; UG/1
2 AEROSOL RESPIRATORY (INHALATION) 2 TIMES DAILY
Qty: 3 INHALER | Refills: 3 | Status: SHIPPED | OUTPATIENT
Start: 2020-05-11 | End: 2021-08-17

## 2020-05-11 RX ORDER — FAMOTIDINE 20 MG/1
20 TABLET, FILM COATED ORAL 2 TIMES DAILY PRN
Qty: 180 TABLET | Refills: 3 | Status: SHIPPED | OUTPATIENT
Start: 2020-05-11 | End: 2021-02-17 | Stop reason: ALTCHOICE

## 2020-05-11 ASSESSMENT — ENCOUNTER SYMPTOMS
CONSTIPATION: 0
PHOTOPHOBIA: 0
COUGH: 0
WHEEZING: 1
EYE DISCHARGE: 0
FACIAL SWELLING: 0
ABDOMINAL DISTENTION: 0
SINUS PRESSURE: 0
CHEST TIGHTNESS: 0
NAUSEA: 0
BACK PAIN: 1
STRIDOR: 0
APNEA: 0
ABDOMINAL PAIN: 0
SORE THROAT: 0
EYE REDNESS: 0
RHINORRHEA: 0
DIARRHEA: 0
RECTAL PAIN: 0
HEARTBURN: 0
CHOKING: 0
SPUTUM PRODUCTION: 0
EYE ITCHING: 0
DIFFICULTY BREATHING: 0
SHORTNESS OF BREATH: 0
VOMITING: 0
BLOOD IN STOOL: 0
COLOR CHANGE: 0
EYE PAIN: 0
BLURRED VISION: 0
ANAL BLEEDING: 0
ORTHOPNEA: 0

## 2020-05-11 NOTE — TELEPHONE ENCOUNTER
Pt hasn't received the link for her appt today at 9:40a. I have verified that the email we have on file for her is where it needs to be resent to.

## 2020-05-11 NOTE — PROGRESS NOTES
capsule TAKE 1 CAPSULE BY MOUTH DAILY. STOP THE 30 MG  Victorina Chen MD   metFORMIN (GLUCOPHAGE) 500 MG tablet Take 1 tablet by mouth Daily with supper  Victorina Chen MD   baclofen (LIORESAL) 10 MG tablet TAKE 1 TABLET BY MOUTH THREE TIMES DAILY  Victorina Chen MD   ranitidine (ZANTAC) 150 MG tablet TAKE 1 TABLET BY MOUTH TWICE DAILY  Victorina Chen MD   montelukast (SINGULAIR) 10 MG tablet TAKE 1 TABLET BY MOUTH DAILY  Victorina Chen MD   Cholecalciferol (VITAMIN D3) 5000 units CAPS Take 1 capsule by mouth daily  Victorina Chen MD   Respiratory Therapy Supplies (NEBULIZER/TUBING/MOUTHPIECE) KIT 1 kit by Does not apply route 4 times daily as needed (asthma)  Victorina Chen MD   budesonide-formoterol (SYMBICORT) 160-4.5 MCG/ACT AERO Inhale 2 puffs into the lungs 2 times daily Fax to [facility].   Victorina Chen MD   albuterol (PROVENTIL) (2.5 MG/3ML) 0.083% nebulizer solution Take 3 mLs by nebulization every 6 hours as needed for Wheezing  Victorina Chen MD   hydrochlorothiazide (HYDRODIURIL) 25 MG tablet TAKE 1 TABLET EVERY DAY  Victorina Chen MD   nitroGLYCERIN (NITROSTAT) 0.4 MG SL tablet PLACE 1 TABLET UNDER THE TONGUE EVERY 5 MINS AS NEEDED FOR CHEST PAIN  Victorina Chen MD   Nebulizers (COMPRESSOR/NEBULIZER) MISC 1 each by Does not apply route every 4-6 hours as needed (shortness of breath)  Victorina Chen MD   albuterol sulfate HFA (PROVENTIL HFA) 108 (90 Base) MCG/ACT inhaler Inhale 2 puffs into the lungs every 6 hours as needed for Wheezing  Victorina Chen MD   albuterol sulfate HFA (VENTOLIN HFA) 108 (90 Base) MCG/ACT inhaler Inhale 2 puffs into the lungs every 6 hours as needed for Wheezing  Victorina Chen MD   nystatin (MYCOSTATIN) 232089 UNIT/ML suspension Take 5 mLs by mouth 4 times daily  Victorina Chen MD   benzonatate (TESSALON) 100 MG capsule Take 100 mg by mouth 3 times daily as needed for Cough  Historical Provider, MD   Cyanocobalamin 2500 MCG SUBL Place 1 tablet under the tongue daily  Alexsander Estrada MD   fluticasone (FLONASE) 50 MCG/ACT nasal spray 1 spray by Nasal route daily  Alexsander Estrada MD   Incontinence Supply Disposable (DEPEND OVERNIGHT BRIEFS LARGE) MISC 1 Device by Does not apply route three times daily. Cipriano Almeida MD   Fish Oil-Cholecalciferol (FISH OIL + D3 PO) Take 1 tablet by mouth daily. Historical Provider, MD   aspirin 81 MG tablet Take 1 tablet by mouth daily. Alexsander Estrada MD        Social History     Tobacco Use    Smoking status: Never Smoker    Smokeless tobacco: Never Used   Substance Use Topics    Alcohol use: No        There were no vitals filed for this visit. Estimated body mass index is 59.24 kg/m² as calculated from the following:    Height as of 7/10/17: 5' 5\" (1.651 m). Weight as of 5/13/19: 356 lb (161.5 kg). Review of Systems   Constitutional: Negative for activity change, appetite change, chills, diaphoresis, fatigue, fever and malaise/fatigue. HENT: Negative for congestion, dental problem, drooling, ear pain, facial swelling, hearing loss, nosebleeds, postnasal drip, rhinorrhea, sinus pressure, sneezing, sore throat and tinnitus. Allergic rhinitis  . Eyes: Negative for blurred vision, photophobia, pain, discharge, redness, itching and visual disturbance. Respiratory: Positive for wheezing. Negative for apnea, cough, sputum production, choking, chest tightness, shortness of breath and stridor. Long-standing history of obstructive sleep apnea noncompliant with CPAP and long-standing history of asthma. Patient never smoked. Cardiovascular: Negative for chest pain, dyspnea on exertion, palpitations, orthopnea, leg swelling and PND.    Gastrointestinal: Negative for abdominal distention, abdominal pain, anal bleeding, blood in stool, constipation, diarrhea, heartburn,

## 2020-05-14 ENCOUNTER — TELEPHONE (OUTPATIENT)
Dept: PRIMARY CARE CLINIC | Age: 70
End: 2020-05-14

## 2020-05-14 NOTE — TELEPHONE ENCOUNTER
Per [facility] pharmacy the albuterol sulfate HFA (PROVENTIL HFA) 108 (90 Base) MCG/ACT inhaler   Pt' has reported sympathomimetic allergy and there is a potential for cross- sensitivity, is this ok to fill or no?     I can call [employer] and advise [phone]    yes it is okay to fill script.

## 2020-07-20 ENCOUNTER — TELEPHONE (OUTPATIENT)
Dept: PRIMARY CARE CLINIC | Age: 70
End: 2020-07-20

## 2020-07-20 NOTE — TELEPHONE ENCOUNTER
----- Message from Elli Venegas sent at 7/20/2020  1:01 PM EDT -----  Subject: Message to Provider    QUESTIONS  Information for Provider? Pt is wanting to know if the 3-4 referrals that   were made for her on 05/11 could be mailed to the address on file for her. She is wanting to start getting those done before scheduling an appt with   Dr. Mayra Clark. ---------------------------------------------------------------------------  --------------  Javi HINDS  What is the best way for the office to contact you? OK to leave message on   voicemail  Preferred Call Back Phone Number? [phone]  ---------------------------------------------------------------------------  --------------  SCRIPT ANSWERS  Relationship to Patient?  Self

## 2020-07-30 ENCOUNTER — TELEPHONE (OUTPATIENT)
Dept: PRIMARY CARE CLINIC | Age: 70
End: 2020-07-30

## 2020-07-30 NOTE — TELEPHONE ENCOUNTER
That place that referrals were given for screening mammogram. Call [phone] to schedule. Make an appointment to see the eye doctor. Give contact information for patient and epic for her recent eye referral. Also referred to the counselor. Give contact information in epic.

## 2020-09-14 RX ORDER — FLUTICASONE PROPIONATE 50 MCG
SPRAY, SUSPENSION (ML) NASAL
Qty: 48 G | Refills: 5 | Status: SHIPPED | OUTPATIENT
Start: 2020-09-14 | End: 2020-09-15 | Stop reason: SDUPTHER

## 2020-09-15 ENCOUNTER — TELEPHONE (OUTPATIENT)
Dept: PRIMARY CARE CLINIC | Age: 70
End: 2020-09-15

## 2020-09-15 VITALS — HEART RATE: 76 BPM | SYSTOLIC BLOOD PRESSURE: 108 MMHG | DIASTOLIC BLOOD PRESSURE: 61 MMHG

## 2020-09-15 RX ORDER — FLUTICASONE PROPIONATE 50 MCG
SPRAY, SUSPENSION (ML) NASAL
Qty: 3 BOTTLE | Refills: 3 | Status: SHIPPED | OUTPATIENT
Start: 2020-09-15 | End: 2022-04-26 | Stop reason: ALTCHOICE

## 2020-09-15 RX ORDER — AZITHROMYCIN 250 MG/1
250 TABLET, FILM COATED ORAL SEE ADMIN INSTRUCTIONS
Qty: 6 TABLET | Refills: 0 | Status: SHIPPED | OUTPATIENT
Start: 2020-09-15 | End: 2020-09-20

## 2020-09-15 ASSESSMENT — ENCOUNTER SYMPTOMS
VOMITING: 0
CONSTIPATION: 0
COLOR CHANGE: 0
ABDOMINAL PAIN: 0
NAUSEA: 0
EYE PAIN: 0
RECTAL PAIN: 0
BLOOD IN STOOL: 0
COUGH: 0
SWOLLEN GLANDS: 0
EYE REDNESS: 0
APNEA: 0
STRIDOR: 0
EYE ITCHING: 0
ANAL BLEEDING: 0
SINUS COMPLAINT: 1
SORE THROAT: 1
ABDOMINAL DISTENTION: 0
RHINORRHEA: 0
CHEST TIGHTNESS: 0
WHEEZING: 0
SINUS PRESSURE: 1
EYE DISCHARGE: 0
CHOKING: 0
FACIAL SWELLING: 0
SHORTNESS OF BREATH: 0
DIARRHEA: 0
PHOTOPHOBIA: 0
BACK PAIN: 0
HOARSE VOICE: 0

## 2020-09-15 NOTE — PROGRESS NOTES
2020    TELEHEALTH EVALUATION -- Audio/Visual (During [facility]FHKE-31 public health emergency)    HPI:    Dayday Agosto (:  1950) has requested an telephone evaluation for the following concern(s):    Sinus Problem   This is a chronic problem. The current episode started more than 1 month ago. The problem is unchanged. There has been no fever. Fever duration: taking flonase for two days that is really helping. The pain is moderate. Associated symptoms include congestion, headaches, sinus pressure and a sore throat. Pertinent negatives include no chills, coughing, diaphoresis, ear pain, hoarse voice, neck pain, shortness of breath, sneezing or swollen glands. Treatments tried: zyrtec 10 mg daily and started flonase  a few days ago. The treatment provided mild relief. Review of Systems   Constitutional: Negative for activity change, appetite change, chills, diaphoresis, fatigue and fever. HENT: Positive for congestion, sinus pressure and sore throat. Negative for dental problem, drooling, ear pain, facial swelling, hearing loss, hoarse voice, nosebleeds, postnasal drip, rhinorrhea, sneezing and tinnitus. Allergic rhinitis  . Eyes: Negative for photophobia, pain, discharge, redness, itching and visual disturbance. Respiratory: Negative for apnea, cough, choking, chest tightness, shortness of breath, wheezing and stridor. Long-standing history of obstructive sleep apnea noncompliant with CPAP and long-standing history of asthma. Patient never smoked. Cardiovascular: Negative for chest pain, palpitations and leg swelling. Gastrointestinal: Negative for abdominal distention, abdominal pain, anal bleeding, blood in stool, constipation, diarrhea, nausea, rectal pain and vomiting. Endocrine: Negative for cold intolerance, polydipsia, polyphagia and polyuria. Genitourinary: Negative for dyspareunia, dysuria, hematuria, pelvic pain and vaginal bleeding.         Menopause Cholecalciferol (VITAMIN D3) 125 MCG (5000 UT) CAPS Take 1 capsule by mouth daily  Mary Walter MD   montelukast (SINGULAIR) 10 MG tablet Take 1 tablet by mouth daily  Mary Walter MD   budesonide-formoterol (SYMBICORT) 160-4.5 MCG/ACT AERO Inhale 2 puffs into the lungs 2 times daily Fax to [facility]. Mary Walter MD   hydroCHLOROthiazide (HYDRODIURIL) 25 MG tablet TAKE 1 TABLET EVERY DAY  Mary Walter MD   albuterol sulfate HFA (PROVENTIL HFA) 108 (90 Base) MCG/ACT inhaler Inhale 2 puffs into the lungs every 6 hours as needed for Wheezing  Mary Walter MD   Respiratory Therapy Supplies (NEBULIZER/TUBING/MOUTHPIECE) KIT 1 kit by Does not apply route 4 times daily as needed (asthma)  Mary Walter MD   albuterol (PROVENTIL) (2.5 MG/3ML) 0.083% nebulizer solution Take 3 mLs by nebulization every 6 hours as needed for Wheezing  Mary Walter MD   nitroGLYCERIN (NITROSTAT) 0.4 MG SL tablet PLACE 1 TABLET UNDER THE TONGUE EVERY 5 MINS AS NEEDED FOR CHEST PAIN  Mary Walter MD   Nebulizers (COMPRESSOR/NEBULIZER) MISC 1 each by Does not apply route every 4-6 hours as needed (shortness of breath)  Mary Walter MD   albuterol sulfate HFA (VENTOLIN HFA) 108 (90 Base) MCG/ACT inhaler Inhale 2 puffs into the lungs every 6 hours as needed for Wheezing  Mary Walter MD   nystatin (MYCOSTATIN) 868765 UNIT/ML suspension Take 5 mLs by mouth 4 times daily  Mary Walter MD   Cyanocobalamin 2500 MCG SUBL Place 1 tablet under the tongue daily  Mary Walter MD   Incontinence Supply Disposable (DEPEND OVERNIGHT BRIEFS LARGE) MISC 1 Device by Does not apply route three times daily. Janett Conteh MD   Fish Oil-Cholecalciferol (FISH OIL + D3 PO) Take 1 tablet by mouth daily. Historical Provider, MD   aspirin 81 MG tablet Take 1 tablet by mouth daily.   Mary Walter MD       Social History     Tobacco Date(s) Administered    Influenza 01/31/2013    Influenza Virus Vaccine 10/31/2013    Influenza, High Dose (Fluzone 65 yrs and older) 09/30/2015    Pneumococcal Conjugate 13-valent ([id number]) 05/14/2015    Pneumococcal Polysaccharide ([id number]) 10/31/2013   ,   Health Maintenance   Topic Date Due    DTaP/Tdap/Td vaccine (1 - Tdap) 12/20/1968    Shingles Vaccine (1 of 2) 12/20/1999    Breast cancer screen  09/04/2017    Diabetic microalbuminuria test  07/10/2018    Potassium monitoring  07/10/2018    Creatinine monitoring  07/10/2018    Pneumococcal 65+ years Vaccine (2 of 2 - PPSV23) 10/31/2018    Annual Wellness Visit (AWV)  05/29/2019    Diabetic foot exam  05/13/2020    A1C test (Diabetic or Prediabetic)  05/13/2020    Lipid screen  05/13/2020    Flu vaccine (1) 09/01/2020    Diabetic retinal exam  08/11/2022    Colon cancer screen colonoscopy  06/05/2023    Hepatitis C screen  Completed    DEXA (modify frequency per FRAX score)  Addressed    Hepatitis A vaccine  Aged Out    Hib vaccine  Aged Out    Meningococcal (ACWY) vaccine  Aged Out       PHYSICAL EXAMINATION:  [ INSTRUCTIONS:  \"[x]\" Indicates a positive item  \"[]\" Indicates a negative item  -- DELETE ALL ITEMS NOT EXAMINED]  Vital Signs: (As obtained by patient/caregiver or practitioner observation)          ASSESSMENT/PLAN:   Diagnosis Orders   1. Sore throat    Of an on for a few weeks. Daughter is currently hospitalized for covid 19, will test patient. 2. Vitamin B 12 deficiency     3. Acute recurrent maxillary sinusitis  fluticasone (FLONASE) 50 MCG/ACT nasal spray    azithromycin (ZITHROMAX) 250 MG tablet    COVID-19 Ambulatory       Radha Setting is a 78 y.o. female being evaluated by a Virtual Visit (video visit) encounter to address concerns as mentioned above. A caregiver was present when appropriate.  Due to this being a TeleHealth encounter (During [facility]- public health emergency), evaluation of the

## 2020-09-16 ENCOUNTER — VIRTUAL VISIT (OUTPATIENT)
Dept: PRIMARY CARE CLINIC | Age: 70
End: 2020-09-16
Payer: MEDICARE

## 2020-09-16 PROCEDURE — 99443 PR PHYS/QHP TELEPHONE EVALUATION 21-30 MIN: CPT | Performed by: INTERNAL MEDICINE

## 2020-09-25 ENCOUNTER — OFFICE VISIT (OUTPATIENT)
Dept: PRIMARY CARE CLINIC | Age: 70
End: 2020-09-25
Payer: MEDICARE

## 2020-09-25 PROCEDURE — G8428 CUR MEDS NOT DOCUMENT: HCPCS | Performed by: NURSE PRACTITIONER

## 2020-09-25 PROCEDURE — 99211 OFF/OP EST MAY X REQ PHY/QHP: CPT | Performed by: NURSE PRACTITIONER

## 2020-09-25 PROCEDURE — G8417 CALC BMI ABV UP PARAM F/U: HCPCS | Performed by: NURSE PRACTITIONER

## 2020-09-28 LAB — SARS-COV-2, NAA: NOT DETECTED

## 2020-10-06 ENCOUNTER — TELEPHONE (OUTPATIENT)
Dept: PRIMARY CARE CLINIC | Age: 70
End: 2020-10-06

## 2020-10-06 NOTE — TELEPHONE ENCOUNTER
----- Message from Patricia Millan sent at 10/5/2020  1:44 PM EDT -----  Subject: Message to Provider     QUESTIONS  Information for Provider? pt would like to speak with provider regarding   referred diabetic eye exam  ---------------------------------------------------------------------------  --------------  CALL BACK INFO  What is the best way for the office to contact you? OK to leave message on   voicemail  Preferred Call Back Phone Number? [phone]  ---------------------------------------------------------------------------  --------------  SCRIPT ANSWERS  Relationship to Patient? Self           Let patient know that is a routine referral for eye care that she can schedule at her convenience.

## 2020-10-06 NOTE — TELEPHONE ENCOUNTER
----- Message from Patricia Millan sent at 10/5/2020  1:44 PM EDT -----  Subject: Message to Provider    QUESTIONS  Information for Provider? pt would like to speak with provider regarding   referred diabetic eye exam  ---------------------------------------------------------------------------  --------------  CALL BACK INFO  What is the best way for the office to contact you? OK to leave message on   voicemail  Preferred Call Back Phone Number? [phone]  ---------------------------------------------------------------------------  --------------  SCRIPT ANSWERS  Relationship to Patient?  Self

## 2020-11-10 ENCOUNTER — TELEPHONE (OUTPATIENT)
Dept: PRIMARY CARE CLINIC | Age: 70
End: 2020-11-10

## 2021-01-12 ENCOUNTER — VIRTUAL VISIT (OUTPATIENT)
Dept: PRIMARY CARE CLINIC | Age: 71
End: 2021-01-12
Payer: MEDICARE

## 2021-01-12 VITALS — TEMPERATURE: 97.3 F | RESPIRATION RATE: 16 BRPM

## 2021-01-12 DIAGNOSIS — E11.9 TYPE 2 DIABETES MELLITUS WITHOUT COMPLICATION, WITHOUT LONG-TERM CURRENT USE OF INSULIN (HCC): ICD-10-CM

## 2021-01-12 DIAGNOSIS — F41.9 ANXIETY: ICD-10-CM

## 2021-01-12 DIAGNOSIS — E78.2 MIXED HYPERLIPIDEMIA: ICD-10-CM

## 2021-01-12 DIAGNOSIS — E55.9 VITAMIN D DEFICIENCY: ICD-10-CM

## 2021-01-12 DIAGNOSIS — Z12.11 COLON CANCER SCREENING: ICD-10-CM

## 2021-01-12 DIAGNOSIS — Z78.0 MENOPAUSE: ICD-10-CM

## 2021-01-12 DIAGNOSIS — M17.0 PRIMARY OSTEOARTHRITIS OF BOTH KNEES: ICD-10-CM

## 2021-01-12 DIAGNOSIS — Z12.31 ENCOUNTER FOR SCREENING MAMMOGRAM FOR BREAST CANCER: Primary | ICD-10-CM

## 2021-01-12 DIAGNOSIS — E53.8 VITAMIN B 12 DEFICIENCY: ICD-10-CM

## 2021-01-12 DIAGNOSIS — L60.9 ABNORMALITY OF NAIL SURFACE: ICD-10-CM

## 2021-01-12 PROCEDURE — 2022F DILAT RTA XM EVC RTNOPTHY: CPT | Performed by: INTERNAL MEDICINE

## 2021-01-12 PROCEDURE — 4040F PNEUMOC VAC/ADMIN/RCVD: CPT | Performed by: INTERNAL MEDICINE

## 2021-01-12 PROCEDURE — 3017F COLORECTAL CA SCREEN DOC REV: CPT | Performed by: INTERNAL MEDICINE

## 2021-01-12 PROCEDURE — 3046F HEMOGLOBIN A1C LEVEL >9.0%: CPT | Performed by: INTERNAL MEDICINE

## 2021-01-12 PROCEDURE — 99213 OFFICE O/P EST LOW 20 MIN: CPT | Performed by: INTERNAL MEDICINE

## 2021-01-12 PROCEDURE — 1123F ACP DISCUSS/DSCN MKR DOCD: CPT | Performed by: INTERNAL MEDICINE

## 2021-01-12 PROCEDURE — G8400 PT W/DXA NO RESULTS DOC: HCPCS | Performed by: INTERNAL MEDICINE

## 2021-01-12 PROCEDURE — 1090F PRES/ABSN URINE INCON ASSESS: CPT | Performed by: INTERNAL MEDICINE

## 2021-01-12 PROCEDURE — G8427 DOCREV CUR MEDS BY ELIG CLIN: HCPCS | Performed by: INTERNAL MEDICINE

## 2021-01-12 RX ORDER — GLUCOSAMINE HCL/CHONDROITIN SU 500-400 MG
CAPSULE ORAL
Qty: 100 STRIP | Refills: 3 | Status: SHIPPED | OUTPATIENT
Start: 2021-01-12

## 2021-01-12 ASSESSMENT — ENCOUNTER SYMPTOMS
RECTAL PAIN: 0
EYE REDNESS: 0
PHOTOPHOBIA: 0
CONSTIPATION: 0
RHINORRHEA: 0
STRIDOR: 0
VISUAL CHANGE: 0
DIARRHEA: 0
COUGH: 0
SORE THROAT: 0
COLOR CHANGE: 0
NAUSEA: 0
BLURRED VISION: 0
EYE DISCHARGE: 0
VOMITING: 0
ANAL BLEEDING: 0
CHEST TIGHTNESS: 0
FACIAL SWELLING: 0
SHORTNESS OF BREATH: 0
CHOKING: 0
WHEEZING: 0
SINUS PRESSURE: 0
EYE PAIN: 0
ABDOMINAL PAIN: 0
ABDOMINAL DISTENTION: 0
APNEA: 0
EYE ITCHING: 0
BLOOD IN STOOL: 0
BACK PAIN: 0

## 2021-01-12 NOTE — PROGRESS NOTES
Ozzy Foster (:  1950) is a 69 y.o. female,Established patient, here for evaluation of the following chief complaint(s): No chief complaint on file. ASSESSMENT/PLAN:   Diagnosis Orders   1. Encounter for screening mammogram for breast cancer with no breast symptoms, will have patient schedule mammogram.  [facility]HAIRA DIGITAL SCREEN BILATERAL   2. Type 2 diabetes mellitus without complication, without long-term current use of insulin (HCC)  Will start back on diet and exercise. Hemoglobin A1C    Comprehensive Metabolic Panel    Urinalysis   3. Vitamin B 12 deficiency :  Stable. CBC Auto Differential   4. Mixed hyperlipidemia :    Lab Results   Component Value Date    CHOL 190 2019    CHOL 271 (H) 2019    CHOL 208 (H) 2017     Lab Results   Component Value Date    TRIG 114 2019    TRIG 107 2019    TRIG 61 2017     Lab Results   Component Value Date    HDL 40 2019    HDL 44 2019    HDL 42 2017     Lab Results   Component Value Date    LDLCALC 127 (H) 2019    LDLCALC 206 (H) 2019    LDLCALC 154 (H) 2017     Lab Results   Component Value Date    LABVLDL 23 2019    LABVLDL 21 2019    LABVLDL 12 2017     No results found for: JACINTOLRCASEY  Will get lipid on crestor, change to take q am , so no skip doses. TSH WITH REFLEX TO FT4    Lipid Panel    Vitamin B12    Lipid Panel   5. Vitamin D deficiency on supplement with goal level 50-80. Vitamin D 25 Hydroxy   6. Anxiety recently  after 47 year. [address] [place] [address]   7. Colon cancer screening , last in , will get. External Referral To Gastroenterology   8. Menopause :   DEXA BONE DENSITY 2 SITES   9. Abnormality of nail surface :nail bed nevus , for past 3 months. [facility]   10. Primary osteoarthritis of both knees take fish oil 3000 mg qd, and tumeric 500 mg qd and black current seed oil 500 mg qd. Told okay to take glucosamine. Long-standing history of obstructive sleep apnea noncompliant with CPAP and long-standing history of asthma. Patient never smoked. Cardiovascular: Negative for chest pain, palpitations and leg swelling. Gastrointestinal: Negative for abdominal distention, abdominal pain, anal bleeding, blood in stool, constipation, diarrhea, nausea, rectal pain and vomiting. Endocrine: Positive for polyuria. Negative for cold intolerance, polydipsia and polyphagia. Genitourinary: Negative for dyspareunia, dysuria, hematuria, pelvic pain and vaginal bleeding. Menopause   Musculoskeletal: Negative for back pain, neck pain and neck stiffness. More low back pain. Lumbar spondylosis and it radiates into both hips. Pain in hips at night and patient has difficulty sleeping due to hip pain. History of severe degenerative arthritis of the knees. History arthritis of the shoulders    Recent exacerbation of left hip pain due to osteoarthritis seen in ER where x-ray shows arthritis and also low back. Skin: Negative. Negative for color change, pallor, rash and wound. Neurological: Positive for headaches. Negative for dizziness, tremors, seizures, speech difficulty, weakness and numbness. Complain of paresthesias in the left leg intermittently for the past year or two.:       Last MRI was in May 2013 of the lumbar spine. IMPRESSION:       L4-L5 and L5-S1 facet arthropathy. No significant stenosis or   impingement on neural structures is identified. No disc   herniations are identified. Hematological: Negative for adenopathy. Does not bruise/bleed easily. Psychiatric/Behavioral: Negative for agitation, behavioral problems, confusion, decreased concentration, dysphoric mood, hallucinations, self-injury, sleep disturbance and suicidal ideas. The patient is not nervous/anxious and is not hyperactive. No flowsheet data found.      Physical Exam    [INSTRUCTIONS:  \"[x]\" Indicates a positive item  \"[]\" Indicates a negative item  -- DELETE ALL ITEMS NOT EXAMINED]    Constitutional: [x] Appears well-developed and well-nourished [x] No apparent distress      [] Abnormal -     Mental status: [x] Alert and awake  [x] Oriented to person/place/time [x] Able to follow commands    [] Abnormal -     Eyes:   EOM    [x]  Normal    [] Abnormal -   Sclera  [x]  Normal    [] Abnormal -          Discharge [x]  None visible   [] Abnormal -     HENT: [x] Normocephalic, atraumatic  [] Abnormal -   [x] Mouth/Throat: Mucous membranes are moist    External Ears [x] Normal  [] Abnormal -    Neck: [x] No visualized mass [] Abnormal -     Pulmonary/Chest: [x] Respiratory effort normal   [x] No visualized signs of difficulty breathing or respiratory distress        [] Abnormal -      Musculoskeletal:   [x] Normal gait with no signs of ataxia         [x] Normal range of motion of neck        [] Abnormal -     Neurological:        [x] No Facial Asymmetry (Cranial nerve 7 motor function) (limited exam due to video visit)          [x] No gaze palsy        [] Abnormal -          Skin:        [x] No significant exanthematous lesions or discoloration noted on facial skin         [] Abnormal -            Psychiatric:       [x] Normal Affect [] Abnormal -        [x] No Hallucinations    Other pertinent observable physical exam findings:-          On this date 01/12/21 I have spent 25 minutes reviewing previous notes, test results and face to face (virtual) with the patient discussing the diagnosis and importance of compliance with the treatment plan as well as documenting on the day of the visit. Pee Lucas is a 69 y.o. female being evaluated by a Virtual Visit (video visit) encounter to address concerns as mentioned above. A caregiver was present when appropriate.  Due to this being a TeleHealth encounter (During [facility] public health emergency), evaluation of the following organ systems was limited: Vitals/Constitutional/EENT/Resp/CV/GI//MS/Neuro/Skin/Heme-Lymph-Imm. Pursuant to the emergency declaration under the [address] [place] [address] and the [employer] Act, this Virtual Visit was conducted with patient's (and/or legal guardian's) consent, to reduce the patient's risk of exposure to COVID-19 and provide necessary medical care. The patient (and/or legal guardian) has also been advised to contact this office for worsening conditions or problems, and seek emergency medical treatment and/or call 911 if deemed necessary. Patient identification was verified at the start of the visit: Yes    Services were provided through a video synchronous discussion virtually to substitute for in-person clinic visit. Patient was located at home and provider was located in office or at home. An electronic signature was used to authenticate this note.     --Wilfred Jameson MD

## 2021-01-14 ENCOUNTER — TELEPHONE (OUTPATIENT)
Dept: PRIMARY CARE CLINIC | Age: 71
End: 2021-01-14

## 2021-01-15 DIAGNOSIS — F41.9 ANXIETY AND DEPRESSION: Primary | ICD-10-CM

## 2021-01-15 DIAGNOSIS — F32.A ANXIETY AND DEPRESSION: Primary | ICD-10-CM

## 2021-01-15 RX ORDER — ESCITALOPRAM OXALATE 10 MG/1
10 TABLET ORAL DAILY
Qty: 30 TABLET | Refills: 5 | Status: SHIPPED | OUTPATIENT
Start: 2021-01-15 | End: 2021-08-17 | Stop reason: ALTCHOICE

## 2021-02-17 ENCOUNTER — VIRTUAL VISIT (OUTPATIENT)
Dept: INTERNAL MEDICINE CLINIC | Age: 71
End: 2021-02-17
Payer: MEDICARE

## 2021-02-17 DIAGNOSIS — E11.9 TYPE 2 DIABETES MELLITUS WITHOUT COMPLICATION, WITHOUT LONG-TERM CURRENT USE OF INSULIN (HCC): ICD-10-CM

## 2021-02-17 DIAGNOSIS — Z00.00 ROUTINE GENERAL MEDICAL EXAMINATION AT A HEALTH CARE FACILITY: Primary | ICD-10-CM

## 2021-02-17 DIAGNOSIS — J45.30 MILD PERSISTENT ASTHMA, UNSPECIFIED WHETHER COMPLICATED: ICD-10-CM

## 2021-02-17 DIAGNOSIS — R06.83 SNORING: ICD-10-CM

## 2021-02-17 DIAGNOSIS — E78.2 MIXED HYPERLIPIDEMIA: ICD-10-CM

## 2021-02-17 DIAGNOSIS — I10 ESSENTIAL HYPERTENSION: ICD-10-CM

## 2021-02-17 DIAGNOSIS — E55.9 VITAMIN D DEFICIENCY: ICD-10-CM

## 2021-02-17 DIAGNOSIS — E53.8 VITAMIN B 12 DEFICIENCY: ICD-10-CM

## 2021-02-17 PROCEDURE — G0439 PPPS, SUBSEQ VISIT: HCPCS | Performed by: INTERNAL MEDICINE

## 2021-02-17 PROCEDURE — G8484 FLU IMMUNIZE NO ADMIN: HCPCS | Performed by: INTERNAL MEDICINE

## 2021-02-17 PROCEDURE — 3046F HEMOGLOBIN A1C LEVEL >9.0%: CPT | Performed by: INTERNAL MEDICINE

## 2021-02-17 PROCEDURE — 3017F COLORECTAL CA SCREEN DOC REV: CPT | Performed by: INTERNAL MEDICINE

## 2021-02-17 PROCEDURE — 4040F PNEUMOC VAC/ADMIN/RCVD: CPT | Performed by: INTERNAL MEDICINE

## 2021-02-17 PROCEDURE — 1123F ACP DISCUSS/DSCN MKR DOCD: CPT | Performed by: INTERNAL MEDICINE

## 2021-02-17 ASSESSMENT — PATIENT HEALTH QUESTIONNAIRE - PHQ9
SUM OF ALL RESPONSES TO PHQ QUESTIONS 1-9: 0
1. LITTLE INTEREST OR PLEASURE IN DOING THINGS: 0
SUM OF ALL RESPONSES TO PHQ9 QUESTIONS 1 & 2: 0
SUM OF ALL RESPONSES TO PHQ QUESTIONS 1-9: 0
SUM OF ALL RESPONSES TO PHQ QUESTIONS 1-9: 0

## 2021-02-17 NOTE — ASSESSMENT & PLAN NOTE
Overall doing well from a clinical standpoint. No known DM complications. - Continue current mgmt with metformin 500mg   - Repeat HbA1c. AIC goal< 7   - BP is within desired goal.  - Will repeat lipid profile. - Last foot exam: due  - Last eye exam: last year  - Meds: on ASA, ARB and statin   - low carb diet and regular exercise  - regular foot care  - repeat BMP,  urine for microalbuminuria  - uptodate with  PSV23 vaccine , flu shot.   - Follow up in 3 months

## 2021-02-17 NOTE — PATIENT INSTRUCTIONS
Personalized Preventive Plan for Wray Litten - 2/17/2021  Medicare offers a range of preventive health benefits. Some of the tests and screenings are paid in full while other may be subject to a deductible, co-insurance, and/or copay. Some of these benefits include a comprehensive review of your medical history including lifestyle, illnesses that may run in your family, and various assessments and screenings as appropriate. After reviewing your medical record and screening and assessments performed today your provider may have ordered immunizations, labs, imaging, and/or referrals for you. A list of these orders (if applicable) as well as your Preventive Care list are included within your After Visit Summary for your review. Other Preventive Recommendations:    · A preventive eye exam performed by an eye specialist is recommended every 1-2 years to screen for glaucoma; cataracts, macular degeneration, and other eye disorders. · A preventive dental visit is recommended every 6 months. · Try to get at least 150 minutes of exercise per week or 10,000 steps per day on a pedometer . · Order or download the FREE \"Exercise & Physical Activity: Your Everyday Guide\" from [employer]. Call 8-378.523.8192 or search [employer] online. · You need 6543-2617 mg of calcium and 0593-0726 IU of vitamin D per day. It is possible to meet your calcium requirement with diet alone, but a vitamin D supplement is usually necessary to meet this goal.  · When exposed to the sun, use a sunscreen that protects against both UVA and UVB radiation with an SPF of 30 or greater. Reapply every 2 to 3 hours or after sweating, drying off with a towel, or swimming. · Always wear a seat belt when traveling in a car. Always wear a helmet when riding a bicycle or motorcycle.

## 2021-02-17 NOTE — PROGRESS NOTES
Does not want people coming to the home but would like other options to help improve ADL's       Options on arthritis

## 2021-02-17 NOTE — ASSESSMENT & PLAN NOTE
Well controlled.  Takes symbicort only twice a week (not for symptoms), rare albuterol use twice a year.   -We will try to stop symbicort, and reassess next visit  -genesisitaliaue albuterol PRN, Singulair

## 2021-02-17 NOTE — PROGRESS NOTES
Medicare Annual Wellness Visit  Name: Marilyn Ponce Date: 2021   MRN: [id number] Sex: Female   Age: 69 y.o. Ethnicity: Non-/Non    : 1950 Race: Black      Sarbjit Bautista is here for [facility] for behavioral, psychosocial and functional/safety risks, and cognitive dysfunction are all negative except as indicated below. These results, as well as other patient data from the [address] form, are documented in Flowsheets linked to this Encounter. Allergies   Allergen Reactions    Beta Adrenergic Blockers Shortness Of Breath     And altered mental status  Disorientation/ asthma attack  And altered mental status    Amoxicillin Diarrhea and Nausea And Vomiting    Other Other (See Comments)    Brompheniramine-Pseudoeph     Gabapentin Other (See Comments)     Dysarthria and confusion  And leg edema.  No Known Allergies     Penicillins     Rondec      Dizziness;  \"spinning\". Prior to Visit Medications    Medication Sig Taking? Authorizing Provider   metFORMIN (GLUCOPHAGE) 500 MG tablet Take 1 tablet by mouth daily Yes Randall Garsia MD   fluticasone (FLONASE) 50 MCG/ACT nasal spray USE 1 SPRAY INTO NOSTRILS TWICE DAILY Yes Ashley Dukes MD   rosuvastatin (CRESTOR) 40 MG tablet Take 1 tablet by mouth daily TAKE 1 TABLET BY MOUTH EVERY NIGHT. STOP CRESTOR 20 MG Yes Ashley Dukes MD   amLODIPine (NORVASC) 10 MG tablet Take 1 tablet by mouth daily Yes Ashley Dukes MD   losartan (COZAAR) 100 MG tablet Take 1 tablet by mouth daily Yes sAhley Dukes MD   Cholecalciferol (VITAMIN D3) 125 MCG (5000 UT) CAPS Take 1 capsule by mouth daily Yes Ashley Dukes MD   montelukast (SINGULAIR) 10 MG tablet Take 1 tablet by mouth daily Yes Ashley Dukes MD   budesonide-formoterol (SYMBICORT) 160-4.5 MCG/ACT AERO Inhale 2 puffs into the lungs 2 times daily Fax to [facility].  Yes Renuka Ruvalcaba Leela Beck MD   hydroCHLOROthiazide (HYDRODIURIL) 25 MG tablet TAKE 1 TABLET EVERY DAY Yes Mariia Dominguez MD   albuterol sulfate HFA (PROVENTIL HFA) 108 (90 Base) MCG/ACT inhaler Inhale 2 puffs into the lungs every 6 hours as needed for Wheezing Yes Mariia Dominguez MD   albuterol (PROVENTIL) (2.5 MG/3ML) 0.083% nebulizer solution Take 3 mLs by nebulization every 6 hours as needed for Wheezing Yes Mariia Dominguez MD   Nebulizers (COMPRESSOR/NEBULIZER) MISC 1 each by Does not apply route every 4-6 hours as needed (shortness of breath) Yes Mariia Dominguez MD   Cyanocobalamin 2500 MCG SUBL Place 1 tablet under the tongue daily Yes Mariia Dominguez MD   Fish Oil-Cholecalciferol (FISH OIL + D3 PO) Take 1 tablet by mouth daily. Yes Historical Provider, MD   aspirin 81 MG tablet Take 1 tablet by mouth daily. Yes Mariia Dominguez MD   escitalopram (LEXAPRO) 10 MG tablet Take 1 tablet by mouth daily  Mariia Dominguez MD   blood glucose monitor kit and supplies Dispense sufficient amount for indicated testing frequency plus additional to accommodate PRN testing needs. Dispense all needed supplies to include: monitor, strips, lancing device, lancets, control solutions, alcohol swabs. Mariia Dominguez MD   blood glucose monitor strips Test 1 times a day  2 hours after dinner daily & as needed for symptoms of irregular blood glucose. Dispense : 100. Mariia Dominguez MD   Respiratory Therapy Supplies (NEBULIZER/TUBING/MOUTHPIECE) KIT 1 kit by Does not apply route 4 times daily as needed (asthma)  Mariia Dominguez MD   nitroGLYCERIN (NITROSTAT) 0.4 MG SL tablet PLACE 1 TABLET UNDER THE TONGUE EVERY 5 MINS AS NEEDED FOR CHEST PAIN  Mariia Dominguez MD   Incontinence Supply Disposable (DEPEND OVERNIGHT BRIEFS LARGE) MISC 1 Device by Does not apply route three times daily.   Ambrosio Jamil MD         Past Medical History:   Diagnosis Date    Allergic rhinitis     Asthma     Chronic back pain     Hyperlipidemia     Hypertension     Liver disease     \"fatty liver\"    Mitral valve prolapse     Neuropathy     Obesity     Osteoarthritis     Type II or unspecified type diabetes mellitus without mention of complication, not stated as uncontrolled     Unspecified sleep apnea     Urinary incontinence        Past Surgical History:   Procedure Laterality Date    COLONOSCOPY      2004 Dr Maldonado Fox ARTHROSCOPY      left knee         Family History   Problem Relation Age of Onset    High Blood Pressure Mother     Heart Disease Mother     Stroke Mother     Asthma Mother     Arthritis Mother     High Blood Pressure Father     Arthritis Father     Cancer Sister         breast at 37.  High Blood Pressure Sister     Heart Disease Sister     Cancer Brother         lung cancer and smoker.     Mental Illness Brother         schizophrenia    Diabetes type 2  Brother     Heart Disease Brother         pacemaker    Diabetes Brother     Diabetes Brother         prediabetes    Diabetes type 2  Brother     Hypertension Sister        CareTeam (Including outside providers/suppliers regularly involved in providing care):   Patient Care Team:  Elina Salcedo MD as PCP - General (Internal Medicine)  Angel Reyna MD as PCP - [facility] Empaneled Provider  Unknown Provider Result (Inactive) as Consulting Physician (Ophthalmology)  Sima Leavitt MD as Consulting Physician (Gastroenterology)  Kamille Corcker MD as Consulting Physician (Gastroenterology)    Wt Readings from Last 3 Encounters:   05/11/20 (!) 303 lb (137.4 kg)   05/13/19 (!) 356 lb (161.5 kg)   02/06/19 (!) 350 lb (158.8 kg)     Patient-Reported Vitals 2/17/2021   Patient-Reported Weight 331lb   Patient-Reported Height 5 6   Patient-Reported Systolic 057   Patient-Reported Diastolic 94   Patient-Reported Pulse 86   Patient-Reported Temperature 97.7   Patient-Reported SpO2 97      There is no height or weight on file to calculate BMI. Based upon direct observation of the patient, evaluation of cognition reveals recent and remote memory intact. Patient's complete Health Risk Assessment and screening values have been reviewed and are found in Flowsheets. The following problems were reviewed today and where indicated follow up appointments were made and/or referrals ordered. Positive Risk Factor Screenings with Interventions:            General Health and ACP:  General  In general, how would you say your health is?: Good  In the past 7 days, have you experienced any of the following?  New or Increased Pain, New or Increased Fatigue, Loneliness, Social Isolation, Stress or Anger?: (!) New or Increased Pain(pain in joints)  Do you get the social and emotional support that you need?: (!) No(not all the time)  Do you have a Living Will?: Yes  Advance Directives     Power of  Living Will ACP-Advance Directive ACP-Power of     Not on File Not on File Not on File Not on File      General Health Risk Interventions:  · discussed the importance of wt loss    Health Habits/Nutrition:  Health Habits/Nutrition  Do you exercise for at least 20 minutes 2-3 times per week?: (!) No  Have you lost any weight without trying in the past 3 months?: No  Do you eat only one meal per day?: No  Have you seen the dentist within the past year?: (!) No     Health Habits/Nutrition Interventions:  · Dental exam overdue:  patient encouraged to make appointment with his/her dentist       Personalized Preventive Plan   Current Health Maintenance Status  Immunization History   Administered Date(s) Administered    Influenza 01/31/2013    Influenza Vaccine, unspecified formulation 09/30/2015    Influenza Virus Vaccine 10/31/2012, 10/31/2013    Influenza, High Dose (Fluzone 65 yrs and older) 09/30/2015    Pneumococcal Conjugate 13-valent ([id number]) 05/14/2015    Pneumococcal Polysaccharide ([id number]) 10/31/2013 Health Maintenance   Topic Date Due    COVID-19 Vaccine (1 of 2) 12/20/1965    DTaP/Tdap/Td vaccine (1 - Tdap) 12/20/1968    Shingles Vaccine (1 of 2) 12/20/1999    Breast cancer screen  09/04/2017    Diabetic microalbuminuria test  07/10/2018    Potassium monitoring  07/10/2018    Creatinine monitoring  07/10/2018    Pneumococcal 65+ years Vaccine (2 of 2 - PPSV23) 10/31/2018    Annual Wellness Visit (AWV)  05/29/2019    Diabetic foot exam  05/13/2020    A1C test (Diabetic or Prediabetic)  05/13/2020    Lipid screen  05/13/2020    Flu vaccine (1) 09/01/2020    Diabetic retinal exam  08/11/2022    Colon cancer screen colonoscopy  06/05/2023    Hepatitis C screen  Completed    DEXA (modify frequency per FRAX score)  Addressed    Hepatitis A vaccine  Aged Out    Hib vaccine  Aged Out    Meningococcal (ACWY) vaccine  Aged Out     Recommendations for [employer] Due: see orders and patient instructions/AVS.  . Recommended screening schedule for the next 5-10 years is provided to the patient in written form: see Patient Instructions/AVS.      Type 2 diabetes mellitus without complication, without long-term current use of insulin ([address]  Overall doing well from a clinical standpoint. No known DM complications. - Continue current mgmt with metformin 500mg   - Repeat HbA1c. AIC goal< 7   - BP is within desired goal.  - Will repeat lipid profile. - Last foot exam: due  - Last eye exam: last year  - Meds: on ASA, ARB and statin   - low carb diet and regular exercise  - regular foot care  - repeat BMP,  urine for microalbuminuria  - uptodate with  PSV23 vaccine , flu shot. - Follow up in 3 months    Asthma  Well controlled.  Takes symbicort only twice a week (not for symptoms), rare albuterol use twice a year.   -We will try to stop symbicort, and reassess next visit  -contiunue albuterol PRN, Singulair     Hypertension  Well controlled  -Continue amlodipine 10 mg and losartan 100 mg  -BMP  -Needs sleep study    Mixed hyperlipidemia  -Repeat lipid panel  -Continue Crestor 40 mg    Vitamin B 12 deficiency  -Repeat B12    Snoring  -Referred to home sleep study    Vitamin D deficiency  -Repeat vitamin D      Orders Placed This Encounter   Procedures    Basic Metabolic Panel    Hemoglobin A1C    Lipid Panel    Vitamin D 25 Hydroxy    Vitamin B12 & Folate    MICROALBUMIN / CREATININE URINE RATIO      Orders Placed This Encounter   Medications    metFORMIN (GLUCOPHAGE) 500 MG tablet     Sig: Take 1 tablet by mouth daily     Dispense:  90 tablet     Refill:  1      Medications Discontinued During This Encounter   Medication Reason    nystatin (MYCOSTATIN) 328083 UNIT/ML suspension LIST CLEANUP    famotidine (PEPCID) 20 MG tablet Therapy completed    metFORMIN (GLUCOPHAGE) 500 MG tablet REORDER        No follow-ups on file. Kassie Paz is a 69 y.o. female being evaluated by a Virtual Visit (video and audio) encounter to address concerns as mentioned above. A caregiver was present when appropriate. Due to this being a TeleHealth encounter (During [facility]- public health emergency), evaluation of the following organ systems was limited: Vitals/Constitutional/EENT/Resp/CV/GI//MS/Neuro/Skin/Heme-Lymph-Imm. Pursuant to the emergency declaration under the [address] [place] [address], [address] [place] [address] authority and the [employer] Act, this Virtual Visit was conducted with patient's (and/or legal guardian's) consent, to reduce the patient's risk of exposure to COVID-19 and provide necessary medical care. The patient (and/or legal guardian) has also been advised to contact this office for worsening conditions or problems, and seek emergency medical treatment and/or call 911 if deemed necessary.      Patient identification was verified at the start of the visit: Yes    Services were provided through a video synchronous discussion virtually to substitute for in-person clinic visit. Patient and provider were located at their individual homes. --Omar Rico MD on 2/17/2021 at 4:22 PM    An electronic signature was used to authenticate this note.

## 2021-03-04 ENCOUNTER — TELEPHONE (OUTPATIENT)
Dept: INTERNAL MEDICINE CLINIC | Age: 71
End: 2021-03-04

## 2021-03-04 DIAGNOSIS — M54.41 BILATERAL LOW BACK PAIN WITH RIGHT-SIDED SCIATICA, UNSPECIFIED CHRONICITY: ICD-10-CM

## 2021-03-04 DIAGNOSIS — N39.41 URGE INCONTINENCE OF URINE: ICD-10-CM

## 2021-03-04 DIAGNOSIS — M47.16 OSTEOARTHRITIS OF LUMBAR SPINE WITH MYELOPATHY: Primary | ICD-10-CM

## 2021-03-04 DIAGNOSIS — M54.31 SCIATICA OF RIGHT SIDE: ICD-10-CM

## 2021-03-04 NOTE — TELEPHONE ENCOUNTER
FYI  Pt states she has arthritis all over her body but mostly effecting her knees. No pain or sx w/ urination other than frequency. Sending Diclofenac gel and Tylenol to confirmed pharmacy.

## 2021-03-04 NOTE — TELEPHONE ENCOUNTER
Patient called stating that she has arthritis real bad and is in a lot of pain. Dr. Arina Pagan, did give her Lexapro, but this started making her sick after she had been on it. Is there something that you cam call into the pharmacy for her to help relieve the pain. She also feels like she is urinating more than usual.     Please call to advise.      [facility] [address] [facility], [facility] [address]

## 2021-03-04 NOTE — TELEPHONE ENCOUNTER
Arthritis where? Can try topical diclofenac gel which can be very helpful with pain. Can also try tylenol 1000mg (up to every 8 hours)  Any pain or burning with urination, foul-smelling urine, fever, chills, flank pain?   Can order UA with reflex culture

## 2021-03-25 DIAGNOSIS — M54.41 BILATERAL LOW BACK PAIN WITH RIGHT-SIDED SCIATICA, UNSPECIFIED CHRONICITY: ICD-10-CM

## 2021-07-28 ENCOUNTER — TELEPHONE (OUTPATIENT)
Dept: INTERNAL MEDICINE CLINIC | Age: 71
End: 2021-07-28

## 2021-07-28 NOTE — TELEPHONE ENCOUNTER
----- Message from [employer] sent at 7/28/2021 12:02 PM EDT -----  Subject: Appointment Request    Reason for Call: Routine Existing Condition Follow Up    QUESTIONS  Type of Appointment? Established Patient  Reason for appointment request? No appointments available during search  Additional Information for Provider? Patient is moving on Aug.18 and would   like a VV before then to get some medications called in and talk about a   handicap sign.   ---------------------------------------------------------------------------  --------------  CALL BACK INFO  What is the best way for the office to contact you? OK to leave message on   voicemail  Preferred Call Back Phone Number? [phone]  ---------------------------------------------------------------------------  --------------  SCRIPT ANSWERS  Relationship to Patient? Self  (Is the patient requesting to be seen urgently for their symptoms?)? No  Is this follow up request related to routine Diabetes Management? No  Are you having any new concerns about your existing condition? No  Have you been diagnosed with, awaiting test results for, or told that you   are suspected of having COVID-19 (Coronavirus)? (If patient has tested   negative or was tested as a requirement for work, school, or travel and   not based on symptoms, answer no)? No  Do you currently have flu-like symptoms including fever or chills, cough,   shortness of breath, difficulty breathing, or new loss of taste or smell? No  Have you had close contact with someone with COVID-19 in the last 14 days? No  (Service Expert  click yes below to proceed with [employer] As Usual   Scheduling)?  Yes

## 2021-07-28 NOTE — TELEPHONE ENCOUNTER
Pt is calling into the office to request a handicap sticker prescription. Please advise. Please call patient with more information.

## 2021-08-17 ENCOUNTER — VIRTUAL VISIT (OUTPATIENT)
Dept: INTERNAL MEDICINE CLINIC | Age: 71
End: 2021-08-17
Payer: MEDICARE

## 2021-08-17 DIAGNOSIS — F32.A ANXIETY AND DEPRESSION: ICD-10-CM

## 2021-08-17 DIAGNOSIS — J45.40 MODERATE PERSISTENT ASTHMA, UNSPECIFIED WHETHER COMPLICATED: ICD-10-CM

## 2021-08-17 DIAGNOSIS — E55.9 VITAMIN D DEFICIENCY: ICD-10-CM

## 2021-08-17 DIAGNOSIS — E78.2 MIXED HYPERLIPIDEMIA: ICD-10-CM

## 2021-08-17 DIAGNOSIS — R73.9 HYPERGLYCEMIA: ICD-10-CM

## 2021-08-17 DIAGNOSIS — I11.9 MALIGNANT HTN WITH HEART DISEASE, W/O CHF, W/O CHRONIC KIDNEY DISEASE: Primary | ICD-10-CM

## 2021-08-17 DIAGNOSIS — F41.9 ANXIETY AND DEPRESSION: ICD-10-CM

## 2021-08-17 DIAGNOSIS — J30.9 ALLERGIC SINUSITIS: ICD-10-CM

## 2021-08-17 PROCEDURE — 99214 OFFICE O/P EST MOD 30 MIN: CPT | Performed by: INTERNAL MEDICINE

## 2021-08-17 PROCEDURE — 3017F COLORECTAL CA SCREEN DOC REV: CPT | Performed by: INTERNAL MEDICINE

## 2021-08-17 PROCEDURE — G8400 PT W/DXA NO RESULTS DOC: HCPCS | Performed by: INTERNAL MEDICINE

## 2021-08-17 PROCEDURE — 1090F PRES/ABSN URINE INCON ASSESS: CPT | Performed by: INTERNAL MEDICINE

## 2021-08-17 PROCEDURE — 1123F ACP DISCUSS/DSCN MKR DOCD: CPT | Performed by: INTERNAL MEDICINE

## 2021-08-17 PROCEDURE — G8427 DOCREV CUR MEDS BY ELIG CLIN: HCPCS | Performed by: INTERNAL MEDICINE

## 2021-08-17 PROCEDURE — 4040F PNEUMOC VAC/ADMIN/RCVD: CPT | Performed by: INTERNAL MEDICINE

## 2021-08-17 RX ORDER — ROSUVASTATIN CALCIUM 40 MG/1
40 TABLET, COATED ORAL DAILY
Qty: 90 TABLET | Refills: 3 | Status: SHIPPED | OUTPATIENT
Start: 2021-08-17 | End: 2021-10-11 | Stop reason: SDUPTHER

## 2021-08-17 RX ORDER — MONTELUKAST SODIUM 10 MG/1
10 TABLET ORAL DAILY
Qty: 90 TABLET | Refills: 3 | Status: SHIPPED | OUTPATIENT
Start: 2021-08-17 | End: 2021-10-11 | Stop reason: SDUPTHER

## 2021-08-17 RX ORDER — AMLODIPINE BESYLATE 10 MG/1
10 TABLET ORAL DAILY
Qty: 90 TABLET | Refills: 3 | Status: SHIPPED | OUTPATIENT
Start: 2021-08-17 | End: 2021-10-11 | Stop reason: SDUPTHER

## 2021-08-17 RX ORDER — ESCITALOPRAM OXALATE 5 MG/1
5 TABLET ORAL DAILY
Qty: 30 TABLET | Refills: 5 | Status: SHIPPED | OUTPATIENT
Start: 2021-08-17 | End: 2021-09-16 | Stop reason: ALTCHOICE

## 2021-08-17 RX ORDER — HYDROCHLOROTHIAZIDE 25 MG/1
TABLET ORAL
Qty: 90 TABLET | Refills: 3 | Status: SHIPPED | OUTPATIENT
Start: 2021-08-17 | End: 2021-10-11 | Stop reason: SDUPTHER

## 2021-08-17 RX ORDER — ALBUTEROL SULFATE 90 UG/1
2 AEROSOL, METERED RESPIRATORY (INHALATION) EVERY 6 HOURS PRN
Qty: 3 INHALER | Refills: 3 | Status: SHIPPED | OUTPATIENT
Start: 2021-08-17

## 2021-08-17 RX ORDER — LOSARTAN POTASSIUM 100 MG/1
100 TABLET ORAL DAILY
Qty: 90 TABLET | Refills: 3 | Status: SHIPPED | OUTPATIENT
Start: 2021-08-17 | End: 2021-10-11 | Stop reason: SDUPTHER

## 2021-08-17 SDOH — ECONOMIC STABILITY: FOOD INSECURITY: WITHIN THE PAST 12 MONTHS, THE FOOD YOU BOUGHT JUST DIDN'T LAST AND YOU DIDN'T HAVE MONEY TO GET MORE.: NEVER TRUE

## 2021-08-17 SDOH — ECONOMIC STABILITY: FOOD INSECURITY: WITHIN THE PAST 12 MONTHS, YOU WORRIED THAT YOUR FOOD WOULD RUN OUT BEFORE YOU GOT MONEY TO BUY MORE.: NEVER TRUE

## 2021-08-17 ASSESSMENT — ENCOUNTER SYMPTOMS
SHORTNESS OF BREATH: 0
ABDOMINAL PAIN: 0
EYE REDNESS: 0
WHEEZING: 1
COUGH: 0
SINUS PRESSURE: 1
NAUSEA: 0
CHEST TIGHTNESS: 0
BACK PAIN: 0

## 2021-08-17 ASSESSMENT — SOCIAL DETERMINANTS OF HEALTH (SDOH): HOW HARD IS IT FOR YOU TO PAY FOR THE VERY BASICS LIKE FOOD, HOUSING, MEDICAL CARE, AND HEATING?: NOT HARD AT ALL

## 2021-08-17 NOTE — PROGRESS NOTES
Subjective:      Patient ID: Lima Causey is a 69 y.o. female    Chief Complaint   Patient presents with    Hypertension    Hyperlipidemia    Sinusitis     VV    Hypertension  This is a chronic problem. The current episode started more than 1 year ago. The problem is resistant. Pertinent negatives include no chest pain, headaches, malaise/fatigue, neck pain, peripheral edema or shortness of breath. Risk factors for coronary artery disease include obesity. Past treatments include calcium channel blockers, angiotensin blockers and diuretics. The current treatment provides moderate improvement. Compliance problems: She has not been taking her medicine regularly. Hyperlipidemia  This is a chronic problem. The current episode started more than 1 year ago. The problem is uncontrolled. Recent lipid tests were reviewed and are variable. Exacerbating diseases include obesity. Pertinent negatives include no chest pain or shortness of breath. Current antihyperlipidemic treatment includes statins. The current treatment provides moderate improvement of lipids. Compliance problems: She has not been taking her statin regularly. Risk factors for coronary artery disease include obesity. Sinusitis  This is a chronic problem. The current episode started more than 1 year ago. There has been no fever. Her pain is at a severity of 2/10. The pain is mild. Associated symptoms include congestion and sinus pressure. Pertinent negatives include no coughing, headaches, neck pain or shortness of breath. Treatments tried: Singulair, Flonase. The treatment provided mild relief. Current Outpatient Medications on File Prior to Visit   Medication Sig Dispense Refill    fluticasone (FLONASE) 50 MCG/ACT nasal spray USE 1 SPRAY INTO NOSTRILS TWICE DAILY 3 Bottle 3    rosuvastatin (CRESTOR) 40 MG tablet Take 1 tablet by mouth daily TAKE 1 TABLET BY MOUTH EVERY NIGHT.  STOP CRESTOR 20 MG 90 tablet 3    amLODIPine (NORVASC) 10 MG tablet Take 1 tablet by mouth daily 90 tablet 3    losartan (COZAAR) 100 MG tablet Take 1 tablet by mouth daily 90 tablet 3    Cholecalciferol (VITAMIN D3) 125 MCG (5000 UT) CAPS Take 1 capsule by mouth daily 90 capsule 3    montelukast (SINGULAIR) 10 MG tablet Take 1 tablet by mouth daily 90 tablet 3    hydroCHLOROthiazide (HYDRODIURIL) 25 MG tablet TAKE 1 TABLET EVERY DAY 90 tablet 3    albuterol sulfate HFA (PROVENTIL HFA) 108 (90 Base) MCG/ACT inhaler Inhale 2 puffs into the lungs every 6 hours as needed for Wheezing 3 Inhaler 3    albuterol (PROVENTIL) (2.5 MG/3ML) 0.083% nebulizer solution Take 3 mLs by nebulization every 6 hours as needed for Wheezing 120 each 3    aspirin 81 MG tablet Take 1 tablet by mouth daily. 90 tablet 4    diclofenac sodium (VOLTAREN) 1 % GEL APPLY 2 GRAMS TOPICALLY TO THE AFFECTED AREA TWICE DAILY 100 g 1    metFORMIN (GLUCOPHAGE) 500 MG tablet Take 1 tablet by mouth daily (Patient not taking: Reported on 8/17/2021) 90 tablet 1    escitalopram (LEXAPRO) 10 MG tablet Take 1 tablet by mouth daily (Patient not taking: Reported on 8/17/2021) 30 tablet 5    blood glucose monitor kit and supplies Dispense sufficient amount for indicated testing frequency plus additional to accommodate PRN testing needs. Dispense all needed supplies to include: monitor, strips, lancing device, lancets, control solutions, alcohol swabs. 1 kit 0    blood glucose monitor strips Test 1 times a day  2 hours after dinner daily & as needed for symptoms of irregular blood glucose. Dispense : 100. 100 strip 3    budesonide-formoterol (SYMBICORT) 160-4.5 MCG/ACT AERO Inhale 2 puffs into the lungs 2 times daily Fax to [facility].  (Patient not taking: Reported on 8/17/2021) 3 Inhaler 3    Respiratory Therapy Supplies (NEBULIZER/TUBING/MOUTHPIECE) KIT 1 kit by Does not apply route 4 times daily as needed (asthma) (Patient not taking: Reported on 8/17/2021) 1 kit 5    nitroGLYCERIN (NITROSTAT) 0.4 MG SL tablet PLACE 1 TABLET UNDER THE TONGUE EVERY 5 MINS AS NEEDED FOR CHEST PAIN (Patient not taking: Reported on 8/17/2021) 75 tablet 1    Nebulizers (COMPRESSOR/NEBULIZER) MISC 1 each by Does not apply route every 4-6 hours as needed (shortness of breath) (Patient not taking: Reported on 8/17/2021) 1 each 5    Cyanocobalamin 2500 MCG SUBL Place 1 tablet under the tongue daily (Patient not taking: Reported on 8/17/2021) 90 tablet 4    Incontinence Supply Disposable (DEPEND OVERNIGHT BRIEFS LARGE) MISC 1 Device by Does not apply route three times daily. 100 each 2    Fish Oil-Cholecalciferol (FISH OIL + D3 PO) Take 1 tablet by mouth daily. (Patient not taking: Reported on 8/17/2021)       No current facility-administered medications on file prior to visit. Allergies   Allergen Reactions    Beta Adrenergic Blockers Shortness Of Breath     And altered mental status  Disorientation/ asthma attack  And altered mental status    Amoxicillin Diarrhea and Nausea And Vomiting    Other Other (See Comments)    Brompheniramine-Pseudoeph     Gabapentin Other (See Comments)     Dysarthria and confusion  And leg edema.  No Known Allergies     Penicillins     Rondec      Dizziness;  \"spinning\".        Past Medical History:   Diagnosis Date    Allergic rhinitis     Asthma     Chronic back pain     Hyperlipidemia     Hypertension     Liver disease     \"fatty liver\"    Mitral valve prolapse     Neuropathy     Obesity     Osteoarthritis     Type II or unspecified type diabetes mellitus without mention of complication, not stated as uncontrolled     Unspecified sleep apnea     Urinary incontinence      Past Surgical History:   Procedure Laterality Date    COLONOSCOPY      2004 Dr Maldonado Osorio ARTHROSCOPY      left knee     Social History     Socioeconomic History    Marital status:      Spouse name: Not on file    Number of children: Not on file    Years of education: Not on file    Highest education level: Not on file   Occupational History    Not on file   Tobacco Use    Smoking status: Never Smoker    Smokeless tobacco: Never Used   Substance and Sexual Activity    Alcohol use: No    Drug use: No    Sexual activity: Yes     Partners: Male   Other Topics Concern    Not on file   Social History Narrative    Not on file     Social Determinants of Health     Financial Resource Strain: Low Risk     Difficulty of Paying Living Expenses: Not hard at all   Food Insecurity: No Food Insecurity    Worried About [address] in the Last Year: Never true    [address] [facility] [address] in the Last Year: Never true   Transportation Needs:     Lack of Transportation (Medical):  Lack of Transportation (Non-Medical):    Physical Activity:     Days of Exercise per Week:     Minutes of Exercise per Session:    Stress:     Feeling of Stress :    Social Connections:     Frequency of Communication with Friends and Family:     Frequency of Social Gatherings with Friends and Family:     Attends [employer] Services:     Active Member of Clubs or Organizations:     Attends Club or Organization Meetings:     Marital Status:    Intimate Partner Violence:     Fear of Current or Ex-Partner:     Emotionally Abused:     Physically Abused:     Sexually Abused:      Family History   Problem Relation Age of Onset    High Blood Pressure Mother     Heart Disease Mother     Stroke Mother     Asthma Mother     Arthritis Mother     High Blood Pressure Father     Arthritis Father     Cancer Sister         breast at 37.  High Blood Pressure Sister     Heart Disease Sister     Cancer Brother         lung cancer and smoker.     Mental Illness Brother         schizophrenia    Diabetes type 2  Brother     Heart Disease Brother         pacemaker    Diabetes Brother     Diabetes Brother         prediabetes    Diabetes type 2  Brother     Hypertension Sister      Immunization History   Administered Date(s) Administered    COVID-19, Pfizer, PF, 30mcg/0.3mL 03/27/2021, 04/17/2021    Influenza 01/31/2013    Influenza Vaccine, unspecified formulation 09/30/2015    Influenza Virus Vaccine 10/31/2012, 10/31/2013    Influenza, High Dose (Fluzone 65 yrs and older) 09/30/2015    Pneumococcal Conjugate 13-valent ([id number]) 05/14/2015    Pneumococcal Polysaccharide ([id number]) 10/31/2013       Review of Systems   Constitutional: Negative for fatigue, fever, malaise/fatigue and unexpected weight change. HENT: Positive for congestion and sinus pressure. Negative for hearing loss. Eyes: Negative for redness and visual disturbance. Respiratory: Positive for wheezing. Negative for cough, chest tightness and shortness of breath. Cardiovascular: Negative for chest pain and leg swelling. Gastrointestinal: Negative for abdominal pain and nausea. Endocrine: Negative for polydipsia and polyuria. Genitourinary: Negative for dysuria and frequency. Musculoskeletal: Positive for arthralgias. Negative for back pain and neck pain. Skin: Negative for rash and wound. Allergic/Immunologic: Positive for environmental allergies. Neurological: Negative for dizziness, weakness and headaches. Hematological: Negative for adenopathy. Does not bruise/bleed easily. Psychiatric/Behavioral: Negative for sleep disturbance. The patient is not nervous/anxious. Objective:    Physical Exam  Constitutional:       Appearance: She is obese. Cardiovascular:      Rate and Rhythm: Normal rate and regular rhythm. Pulmonary:      Effort: Pulmonary effort is normal.      Breath sounds: Normal breath sounds. No wheezing. Musculoskeletal:      Right lower leg: No edema. Left lower leg: No edema. Neurological:      Mental Status: She is alert and oriented to person, place, and time. Psychiatric:         Mood and Affect: Mood normal.       There were no vitals filed for this visit. Assessment and plan       1. 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 Restart medication. Follow-up with physician in [place]. She says she is moving in about 2 weeks. - amLODIPine (NORVASC) 10 MG tablet; Take 1 tablet by mouth daily  Dispense: 90 tablet; Refill: 3  - losartan (COZAAR) 100 MG tablet; Take 1 tablet by mouth daily  Dispense: 90 tablet; Refill: 3  - hydroCHLOROthiazide (HYDRODIURIL) 25 MG tablet; TAKE 1 TABLET EVERY DAY  Dispense: 90 tablet; Refill: 3  - Comprehensive Metabolic Panel; Future    2. Mixed hyperlipidemia  Stable hyperlipidemia. Poor medication compliance. Check lipid levels. - rosuvastatin (CRESTOR) 40 MG tablet; Take 1 tablet by mouth daily TAKE 1 TABLET BY MOUTH EVERY NIGHT. STOP CRESTOR 20 MG  Dispense: 90 tablet; Refill: 3  - Comprehensive Metabolic Panel; Future  - TSH with Reflex; Future  - Lipid Panel; Future    3. Allergic sinusitis  Allergic sinusitis. Continue allergy medicine and check CBC.  - CBC Auto Differential; Future    4. Vitamin D deficiency  Low vitamin D. Replace vitamin D.  - Cholecalciferol (VITAMIN D3) 125 MCG (5000 UT) CAPS; Take 1 capsule by mouth daily  Dispense: 90 capsule; Refill: 3    5. Moderate persistent asthma, unspecified whether complicated  Stable asthma. Continue her current medicine. - montelukast (SINGULAIR) 10 MG tablet; Take 1 tablet by mouth daily  Dispense: 90 tablet; Refill: 3  - albuterol sulfate HFA (PROVENTIL HFA) 108 (90 Base) MCG/ACT inhaler; Inhale 2 puffs into the lungs every 6 hours as needed for Wheezing  Dispense: 3 Inhaler; Refill: 3    6. Anxiety and depression  Recurring anxiety and depression. Start on low-dose Escitalopram.  She had not taken any Escitalopram even though it was prescribed earlier this year. - escitalopram (LEXAPRO) 5 MG tablet; Take 1 tablet by mouth daily  Dispense: 30 tablet; Refill: 5    7. Hyperglycemia  Possible hyperglycemia.   Check A1c.  - Hemoglobin A1C; Guerrero Galeano, was evaluated through a synchronous (real-time) audio-video encounter. The patient (or guardian if applicable) is aware that this is a billable service. Verbal consent to proceed has been obtained within the past 12 months. The visit was conducted pursuant to the emergency declaration under the [address] [place] [address] and the [employer] General Act. Patient identification was verified, and a caregiver was present when appropriate. The patient was located in a state where the provider was credentialed to provide care. Total time spent for this encounter: Not billed by time    --Gerald Albert MD on 8/17/2021 at 12:36 PM    An electronic signature was used to authenticate this note.

## 2021-08-17 NOTE — LETTER
[facility] [address]  [address] [address]  Phone: [phone]  Fax: [phone]    John Ridley MD         August 17, 2021     Patient: Danielle Reddy   YOB: 1950   Date of Visit: 8/17/2021       To Whom It May Concern: It is my medical opinion that Jd Thornton requires a disability parking placard for the following reasons:  She has limited walking ability due to an arthritic condition. Duration of need: 5 years    If you have any questions or concerns, please don't hesitate to call.     Sincerely,        John Ridley MD

## 2021-09-10 ENCOUNTER — TELEPHONE (OUTPATIENT)
Dept: INTERNAL MEDICINE CLINIC | Age: 71
End: 2021-09-10

## 2021-09-10 NOTE — TELEPHONE ENCOUNTER
Patient called stating she would like to speak to you regarding an arthritic pain issue she is having. Please call to advise.

## 2021-09-14 NOTE — TELEPHONE ENCOUNTER
Potentially OA but hard to evaluate without seeing. Can schedule a visit.  Can try tylenol or diclofenac gel locally

## 2021-09-14 NOTE — TELEPHONE ENCOUNTER
Can get more info over the phone (where?, how long?, new/changed? Swelling/redness/warmth/ fever/ rash? ) or schedule a visit.

## 2021-09-14 NOTE — TELEPHONE ENCOUNTER
Pt states that she is having pain off and on, mostly in knees, denies fever, rash, redness. Does have slight swelling.  Has taken IBU for pain with no relief

## 2021-09-16 ENCOUNTER — VIRTUAL VISIT (OUTPATIENT)
Dept: INTERNAL MEDICINE CLINIC | Age: 71
End: 2021-09-16
Payer: MEDICARE

## 2021-09-16 DIAGNOSIS — M25.562 CHRONIC PAIN OF BOTH KNEES: ICD-10-CM

## 2021-09-16 DIAGNOSIS — M25.561 CHRONIC PAIN OF BOTH KNEES: ICD-10-CM

## 2021-09-16 DIAGNOSIS — G89.29 CHRONIC PAIN OF BOTH KNEES: ICD-10-CM

## 2021-09-16 DIAGNOSIS — E11.9 TYPE 2 DIABETES MELLITUS WITHOUT COMPLICATION, WITHOUT LONG-TERM CURRENT USE OF INSULIN (HCC): ICD-10-CM

## 2021-09-16 DIAGNOSIS — E11.40 TYPE 2 DIABETES MELLITUS WITH DIABETIC NEUROPATHY, WITHOUT LONG-TERM CURRENT USE OF INSULIN (HCC): ICD-10-CM

## 2021-09-16 DIAGNOSIS — M15.9 PRIMARY OSTEOARTHRITIS INVOLVING MULTIPLE JOINTS: Primary | ICD-10-CM

## 2021-09-16 PROCEDURE — 2022F DILAT RTA XM EVC RTNOPTHY: CPT | Performed by: INTERNAL MEDICINE

## 2021-09-16 PROCEDURE — 1123F ACP DISCUSS/DSCN MKR DOCD: CPT | Performed by: INTERNAL MEDICINE

## 2021-09-16 PROCEDURE — 4040F PNEUMOC VAC/ADMIN/RCVD: CPT | Performed by: INTERNAL MEDICINE

## 2021-09-16 PROCEDURE — 3017F COLORECTAL CA SCREEN DOC REV: CPT | Performed by: INTERNAL MEDICINE

## 2021-09-16 PROCEDURE — 99214 OFFICE O/P EST MOD 30 MIN: CPT | Performed by: INTERNAL MEDICINE

## 2021-09-16 PROCEDURE — G8427 DOCREV CUR MEDS BY ELIG CLIN: HCPCS | Performed by: INTERNAL MEDICINE

## 2021-09-16 PROCEDURE — 3046F HEMOGLOBIN A1C LEVEL >9.0%: CPT | Performed by: INTERNAL MEDICINE

## 2021-09-16 PROCEDURE — G8400 PT W/DXA NO RESULTS DOC: HCPCS | Performed by: INTERNAL MEDICINE

## 2021-09-16 PROCEDURE — 1090F PRES/ABSN URINE INCON ASSESS: CPT | Performed by: INTERNAL MEDICINE

## 2021-09-16 ASSESSMENT — ENCOUNTER SYMPTOMS: BACK PAIN: 1

## 2021-09-16 NOTE — ASSESSMENT & PLAN NOTE
- had not completed any labs ordered. Unclear current DM control, last A1c 2 years ago 6.7% (5/2019). - I explained it is important to f/u regularly with care. She might decide to move to live with her daughter in [facility]. Explained should find a PCP there to mange chronic issues. - Continue current mgmt with metformin 500mg for now. - Repeat HbA1c when/if she is back. AIC goal< 7   - BP control unclear  - needs repeat lipid profile. - Last foot exam: due  - Last eye exam: due  - Meds: on ASA, ARB and statin   - low carb diet and regular exercise  - regular foot care  - repeat BMP,  urine for microalbuminuria  - uptodate with  PSV23 vaccine.   - Follow up in person when she is back

## 2021-09-16 NOTE — ASSESSMENT & PLAN NOTE
Likely OA. Hx and timeline less consistent with inflammatory joint at this point (unable to examine given VV). Hx of OA documented as well.   -try tylenol 1000mg q8 h prn (prefer over NASIDs)  -try diclofenac gel  -we discussed wt loss can help with OA symptoms  -pt currently visiting her daughter in [facility] (might be moving there in a few weeks, had not decided yet). Unable to obtain imaging or exam. Explained if worsening pain/redness swelling will need to be seen there.    -PT ordered for when she is back

## 2021-09-16 NOTE — PROGRESS NOTES
[facility]  2021      TELEHEALTH EVALUATION -- Audio/Visual (During XCFXX-90 public health emergency)    Millie Barrett (:  1950) has requested an audio/video evaluation for the following concern(s):    Chief Complaint   Patient presents with    Knee Pain     Bi-lat         HPI  Virtual visit for chronic bilateral knee pain. Patient has been going on \"forever\". Pain with activity, no swelling, redness, warmth. Sometimes gets better sometimes gets worse with use. No range of motion limitation, no stiffness, no rash, no fever. Denies history of trauma or injury. Tried aspirin and Tylenol, ibuprofen helps the most.    ROS  Review of Systems   Constitutional: Negative for chills and fever. Musculoskeletal: Positive for arthralgias and back pain. Negative for joint swelling. Skin: Negative for rash. HISTORIES   Current Outpatient Medications on File Prior to Visit   Medication Sig Dispense Refill    rosuvastatin (CRESTOR) 40 MG tablet Take 1 tablet by mouth daily TAKE 1 TABLET BY MOUTH EVERY NIGHT. STOP CRESTOR 20 MG 90 tablet 3    amLODIPine (NORVASC) 10 MG tablet Take 1 tablet by mouth daily 90 tablet 3    losartan (COZAAR) 100 MG tablet Take 1 tablet by mouth daily 90 tablet 3    Cholecalciferol (VITAMIN D3) 125 MCG (5000 UT) CAPS Take 1 capsule by mouth daily 90 capsule 3    montelukast (SINGULAIR) 10 MG tablet Take 1 tablet by mouth daily 90 tablet 3    hydroCHLOROthiazide (HYDRODIURIL) 25 MG tablet TAKE 1 TABLET EVERY DAY 90 tablet 3    albuterol sulfate HFA (PROVENTIL HFA) 108 (90 Base) MCG/ACT inhaler Inhale 2 puffs into the lungs every 6 hours as needed for Wheezing 3 Inhaler 3    diclofenac sodium (VOLTAREN) 1 % GEL APPLY 2 GRAMS TOPICALLY TO THE AFFECTED AREA TWICE DAILY 100 g 1    blood glucose monitor kit and supplies Dispense sufficient amount for indicated testing frequency plus additional to accommodate PRN testing needs.  Dispense all needed supplies to include: monitor, strips, lancing device, lancets, control solutions, alcohol swabs. 1 kit 0    blood glucose monitor strips Test 1 times a day  2 hours after dinner daily & as needed for symptoms of irregular blood glucose. Dispense : 100. 100 strip 3    fluticasone (FLONASE) 50 MCG/ACT nasal spray USE 1 SPRAY INTO NOSTRILS TWICE DAILY 3 Bottle 3    albuterol (PROVENTIL) (2.5 MG/3ML) 0.083% nebulizer solution Take 3 mLs by nebulization every 6 hours as needed for Wheezing 120 each 3    Nebulizers (COMPRESSOR/NEBULIZER) MISC 1 each by Does not apply route every 4-6 hours as needed (shortness of breath) 1 each 5    Cyanocobalamin 2500 MCG SUBL Place 1 tablet under the tongue daily 90 tablet 4    Incontinence Supply Disposable (DEPEND OVERNIGHT BRIEFS LARGE) MISC 1 Device by Does not apply route three times daily. 100 each 2    Fish Oil-Cholecalciferol (FISH OIL + D3 PO) Take 1 tablet by mouth daily       aspirin 81 MG tablet Take 1 tablet by mouth daily. 90 tablet 4     No current facility-administered medications on file prior to visit. Allergies   Allergen Reactions    Beta Adrenergic Blockers Shortness Of Breath     And altered mental status  Disorientation/ asthma attack  And altered mental status    Amoxicillin Diarrhea and Nausea And Vomiting    Other Other (See Comments)    Brompheniramine-Pseudoeph     Gabapentin Other (See Comments)     Dysarthria and confusion  And leg edema.  No Known Allergies     Penicillins     Rondec      Dizziness;  \"spinning\".        Past Medical History:   Diagnosis Date    Allergic rhinitis     Asthma     Chronic back pain     Hyperlipidemia     Hypertension     Liver disease     \"fatty liver\"    Mitral valve prolapse     Neuropathy     Obesity     Osteoarthritis     Type II or unspecified type diabetes mellitus without mention of complication, not stated as uncontrolled     Unspecified sleep apnea     Urinary incontinence Patient Active Problem List   Diagnosis    Vitamin D deficiency    Allergic rhinitis    Tympanic membrane rupture    Snoring    Decreased hearing    Urinary incontinence    Vitamin B 12 deficiency    History of cardiovascular stress test    Menopause    Vaginal atrophy    S/P cardiac cath    Morbid obesity with BMI of 60.0-69.9, adult ([facility])    Asthma    Back pain    Sciatica of right side    Osteoarthritis of lumbar spine    Mixed hyperlipidemia    Osteoarthritis of right hip    Type 2 diabetes mellitus without complication, without long-term current use of insulin ([facility])    Abnormal EKG    Osteoarthrosis involving multiple sites    Hypertension    Type 2 diabetes mellitus with diabetic neuropathy       Past Surgical History:   Procedure Laterality Date    COLONOSCOPY      2004 Dr Maldonado Morales January ARTHROSCOPY      left knee       Social History     Socioeconomic History    Marital status:      Spouse name: Not on file    Number of children: Not on file    Years of education: Not on file    Highest education level: Not on file   Occupational History    Not on file   Tobacco Use    Smoking status: Never Smoker    Smokeless tobacco: Never Used   Substance and Sexual Activity    Alcohol use: No    Drug use: No    Sexual activity: Yes     Partners: Male   Other Topics Concern    Not on file   Social History Narrative    Not on file     Social Determinants of Health     Financial Resource Strain: Low Risk     Difficulty of Paying Living Expenses: Not hard at all   Food Insecurity: No Food Insecurity    Worried About Running Out of Food in the Last Year: Never true    Gretchen of Food in the Last Year: Never true   Transportation Needs:     Lack of Transportation (Medical):      Lack of Transportation (Non-Medical):    Physical Activity:     Days of Exercise per Week:     Minutes of Exercise per Session:    Stress:     Feeling of Stress :    Social Connections:     Frequency of Communication with Friends and Family:     Frequency of Social Gatherings with Friends and Family:     Attends Sikh Services:     Active Member of Clubs or Organizations:     Attends Club or Organization Meetings:     Marital Status:    Intimate Partner Violence:     Fear of Current or Ex-Partner:     Emotionally Abused:     Physically Abused:     Sexually Abused:         Family History   Problem Relation Age of Onset    High Blood Pressure Mother     Heart Disease Mother     Stroke Mother     Asthma Mother     Arthritis Mother     High Blood Pressure Father     Arthritis Father     Cancer Sister         breast at 37.  High Blood Pressure Sister     Heart Disease Sister     Cancer Brother         lung cancer and smoker.  Mental Illness Brother         schizophrenia    Diabetes type 2  Brother     Heart Disease Brother         pacemaker    Diabetes Brother     Diabetes Brother         prediabetes    Diabetes type 2  Brother     Hypertension Sister        PE:  Patient-Reported Vitals 9/16/2021   Patient-Reported Weight 341#   Patient-Reported Height 66\"   Patient-Reported Systolic -   Patient-Reported Diastolic -   Patient-Reported Pulse -   Patient-Reported Temperature -   Patient-Reported SpO2 -      There were no vitals filed for this visit. Constitutional: [x] Appears well-developed and well-nourished [x] No apparent distress      [] Abnormal-   Mental status  [x] Alert and awake  [x] Oriented to person/place/time [x]Able to follow commands      Eyes:  EOM    [x]  Normal  [] Abnormal-  Sclera  [x]  Normal  [] Abnormal -         Discharge [x]  None visible  [] Abnormal -    HENT:   [x] Normocephalic, atraumatic.   [] Abnormal   [x] Mouth/Throat: Mucous membranes are moist.     External Ears [x] Normal  [] Abnormal-     Neck: [x] No visualized mass     Pulmonary/Chest: [x] Respiratory effort normal.  [x] No visualized signs of difficulty breathing or respiratory distress [] Abnormal-      Musculoskeletal:   [] Normal gait with no signs of ataxia         [x] Normal range of motion of neck        [] Abnormal-   No knee swelling, redness, asymmetry appreciated through video today. Normal range of motion. Neurological:        [x] No Facial Asymmetry (Cranial nerve 7 motor function) (limited exam to video visit)          [x] No gaze palsy        [] Abnormal-         Skin:        [x] No significant exanthematous lesions or discoloration noted on facial skin         [] Abnormal-            Psychiatric:       [x] Normal Affect [x] No Hallucinations        [] Abnormal-         ASSESSMENT/ PLAN:  Osteoarthrosis involving multiple sites  Likely OA. Hx and timeline less consistent with inflammatory joint at this point (unable to examine given VV). Hx of OA documented as well.   -try tylenol 1000mg q8 h prn (prefer over NASIDs)  -try diclofenac gel  -we discussed wt loss can help with OA symptoms  -pt currently visiting her daughter in [place] (might be moving there in a few weeks, had not decided yet). Unable to obtain imaging or exam. Explained if worsening pain/redness swelling will need to be seen there. -PT ordered for when she is back       Type 2 diabetes mellitus without complication, without long-term current use of insulin ([facility] [address]  - had not completed any labs ordered. Unclear current DM control, last A1c 2 years ago 6.7% (5/2019). - I explained it is important to f/u regularly with care. She might decide to move to live with her daughter in [facility]. Explained should find a PCP there to mange chronic issues. - Continue current mgmt with metformin 500mg for now. - Repeat HbA1c when/if she is back. AIC goal< 7   - BP control unclear  - needs repeat lipid profile.    - Last foot exam: due  - Last eye exam: due  - Meds: on ASA, ARB and statin   - low carb diet and regular exercise  - regular foot care  - repeat BMP,  urine for microalbuminuria  - uptodate with  PSV23 vaccine. - Follow up in person when she is back      Orders Placed This Encounter   Procedures   [id number]     No orders of the defined types were placed in this encounter. Medications Discontinued During This Encounter   Medication Reason    escitalopram (LEXAPRO) 5 MG tablet Therapy completed        No follow-ups on file. Aileen Llamas MD      Carmen Braden is a 69 y.o. female being evaluated by a Virtual Visit (video visit) encounter to address concerns as mentioned above. A caregiver was present when appropriate. Due to this being a TeleHealth encounter (During LGX-52 public health emergency), evaluation of the following organ systems was limited: Vitals/Constitutional/EENT/Resp/CV/GI//MS/Neuro/Skin/Heme-Lymph-Imm. Pursuant to the emergency declaration under the [address] [place] [address], [address] [place] [address] authority and the [employer] Act, this Virtual Visit was conducted with patient's (and/or legal guardian's) consent, to reduce the patient's risk of exposure to COVID-19 and provide necessary medical care. The patient (and/or legal guardian) has also been advised to contact this office for worsening conditions or problems, and seek emergency medical treatment and/or call 911 if deemed necessary. Patient identification was verified at the start of the visit: Yes    Total time spent on this encounter: not billed by time    Services were provided through a video synchronous discussion virtually to substitute for in-person clinic visit. Patient and provider were located at their individual homes. --Aileen Llamas MD on 9/16/2021 at 11:23 AM    An electronic signature was used to authenticate this note. This dictation was generated by voice recognition computer software.   Although all attempts are made to edit the dictation for accuracy, there may be errors in the transcription that are not intended.

## 2021-09-20 ENCOUNTER — TELEPHONE (OUTPATIENT)
Dept: INTERNAL MEDICINE CLINIC | Age: 71
End: 2021-09-20

## 2021-09-20 NOTE — TELEPHONE ENCOUNTER
----- Message from Wood Gray sent at 9/18/2021  2:22 PM EDT -----  Subject: Message to Provider    QUESTIONS  Information for Provider? Patient is wanting to know if it is safe for her   to take the vitamin KO-Q10. Can you please contact patient with   information regarding the vitamin?  ---------------------------------------------------------------------------  --------------  CALL BACK INFO  What is the best way for the office to contact you? OK to leave message on   voicemail  Preferred Call Back Phone Number? [phone]  ---------------------------------------------------------------------------  --------------  SCRIPT ANSWERS  Relationship to Patient?  Self

## 2021-10-07 DIAGNOSIS — E78.2 MIXED HYPERLIPIDEMIA: ICD-10-CM

## 2021-10-07 DIAGNOSIS — J45.40 MODERATE PERSISTENT ASTHMA, UNSPECIFIED WHETHER COMPLICATED: ICD-10-CM

## 2021-10-07 DIAGNOSIS — I11.9 MALIGNANT HTN WITH HEART DISEASE, W/O CHF, W/O CHRONIC KIDNEY DISEASE: ICD-10-CM

## 2021-10-07 NOTE — TELEPHONE ENCOUNTER
----- Message from Susieerik [place] sent at 9/30/2021  2:29 PM EDT -----  Subject: Refill Request    QUESTIONS  Name of Medication? amLODIPine (NORVASC) 10 MG tablet  Patient-reported dosage and instructions? Once daily   How many days do you have left? Unknown  Preferred Pharmacy? [facility] phone number (if available)? [phone]  ---------------------------------------------------------------------------  --------------,  Name of Medication? hydroCHLOROthiazide (HYDRODIURIL) 25 MG tablet  Patient-reported dosage and instructions? Once daily   How many days do you have left? Unknown  Preferred Pharmacy? [facility] phone number (if available)? [phone]  ---------------------------------------------------------------------------  --------------,  Name of Medication? losartan (COZAAR) 100 MG tablet  Patient-reported dosage and instructions? Once daily   How many days do you have left? Unknown  Preferred Pharmacy? [facility] phone number (if available)? [phone]  ---------------------------------------------------------------------------  --------------,  Name of Medication? rosuvastatin (CRESTOR) 40 MG tablet  Patient-reported dosage and instructions? Once at night   How many days do you have left? Unknown  Preferred Pharmacy? [facility] phone number (if available)? [phone]  ---------------------------------------------------------------------------  --------------,  Name of Medication? montelukast (SINGULAIR) 10 MG tablet  Patient-reported dosage and instructions? Once daily   How many days do you have left? Unknown  Preferred Pharmacy? [facility] phone number (if available)? [phone]  Additional Information for Provider?  Metformin 500 once daily, not on list ---------------------------------------------------------------------------  --------------  Andres HINDS  What is the best way for the office to contact you? Do not leave any   message, patient will call back for answer  Preferred Call Back Phone Number?  [phone]

## 2021-10-08 DIAGNOSIS — I11.9 MALIGNANT HTN WITH HEART DISEASE, W/O CHF, W/O CHRONIC KIDNEY DISEASE: ICD-10-CM

## 2021-10-08 DIAGNOSIS — J45.40 MODERATE PERSISTENT ASTHMA, UNSPECIFIED WHETHER COMPLICATED: ICD-10-CM

## 2021-10-08 DIAGNOSIS — E78.2 MIXED HYPERLIPIDEMIA: ICD-10-CM

## 2021-10-08 RX ORDER — LOSARTAN POTASSIUM 100 MG/1
100 TABLET ORAL DAILY
Qty: 90 TABLET | Refills: 3 | Status: CANCELLED | OUTPATIENT
Start: 2021-10-08

## 2021-10-08 RX ORDER — AMLODIPINE BESYLATE 10 MG/1
10 TABLET ORAL DAILY
Qty: 90 TABLET | Refills: 3 | Status: CANCELLED | OUTPATIENT
Start: 2021-10-08

## 2021-10-08 RX ORDER — HYDROCHLOROTHIAZIDE 25 MG/1
TABLET ORAL
Qty: 90 TABLET | Refills: 3 | Status: CANCELLED | OUTPATIENT
Start: 2021-10-08

## 2021-10-08 RX ORDER — MONTELUKAST SODIUM 10 MG/1
10 TABLET ORAL DAILY
Qty: 90 TABLET | Refills: 3 | Status: CANCELLED | OUTPATIENT
Start: 2021-10-08

## 2021-10-08 RX ORDER — ROSUVASTATIN CALCIUM 40 MG/1
40 TABLET, COATED ORAL DAILY
Qty: 90 TABLET | Refills: 3 | Status: CANCELLED | OUTPATIENT
Start: 2021-10-08

## 2021-10-08 NOTE — TELEPHONE ENCOUNTER
If patient no longer lives in town she needs a new PCP, Dr Sandhya Mahmood will do a short supply only called to ask pat ient the question there was no answer and per message did not leave a message, if patient calls back need to know answer to this question

## 2021-10-11 RX ORDER — AMLODIPINE BESYLATE 10 MG/1
10 TABLET ORAL DAILY
Qty: 90 TABLET | Refills: 3 | OUTPATIENT
Start: 2021-10-11

## 2021-10-11 RX ORDER — ROSUVASTATIN CALCIUM 40 MG/1
40 TABLET, COATED ORAL DAILY
Qty: 30 TABLET | Refills: 0 | Status: SHIPPED | OUTPATIENT
Start: 2021-10-11 | End: 2022-08-15 | Stop reason: SDUPTHER

## 2021-10-11 RX ORDER — HYDROCHLOROTHIAZIDE 25 MG/1
TABLET ORAL
Qty: 90 TABLET | Refills: 3 | OUTPATIENT
Start: 2021-10-11

## 2021-10-11 RX ORDER — ROSUVASTATIN CALCIUM 40 MG/1
40 TABLET, COATED ORAL DAILY
Qty: 90 TABLET | Refills: 3 | OUTPATIENT
Start: 2021-10-11

## 2021-10-11 RX ORDER — AMLODIPINE BESYLATE 10 MG/1
10 TABLET ORAL DAILY
Qty: 30 TABLET | Refills: 0 | Status: SHIPPED | OUTPATIENT
Start: 2021-10-11 | End: 2022-08-15 | Stop reason: SDUPTHER

## 2021-10-11 RX ORDER — LOSARTAN POTASSIUM 100 MG/1
100 TABLET ORAL DAILY
Qty: 90 TABLET | Refills: 3 | OUTPATIENT
Start: 2021-10-11

## 2021-10-11 RX ORDER — HYDROCHLOROTHIAZIDE 25 MG/1
TABLET ORAL
Qty: 30 TABLET | Refills: 0 | Status: SHIPPED | OUTPATIENT
Start: 2021-10-11 | End: 2022-08-15 | Stop reason: SDUPTHER

## 2021-10-11 RX ORDER — LOSARTAN POTASSIUM 100 MG/1
100 TABLET ORAL DAILY
Qty: 30 TABLET | Refills: 0 | Status: SHIPPED | OUTPATIENT
Start: 2021-10-11 | End: 2022-08-15 | Stop reason: SDUPTHER

## 2021-10-11 RX ORDER — MONTELUKAST SODIUM 10 MG/1
10 TABLET ORAL DAILY
Qty: 90 TABLET | Refills: 3 | OUTPATIENT
Start: 2021-10-11

## 2021-10-11 RX ORDER — MONTELUKAST SODIUM 10 MG/1
10 TABLET ORAL DAILY
Qty: 30 TABLET | Refills: 0 | Status: SHIPPED | OUTPATIENT
Start: 2021-10-11 | End: 2022-08-15 | Stop reason: SDUPTHER

## 2021-11-08 ENCOUNTER — TELEPHONE (OUTPATIENT)
Dept: INTERNAL MEDICINE CLINIC | Age: 71
End: 2021-11-08

## 2021-12-09 ENCOUNTER — TELEPHONE (OUTPATIENT)
Dept: INTERNAL MEDICINE CLINIC | Age: 71
End: 2021-12-09

## 2021-12-09 DIAGNOSIS — H10.33 ACUTE CONJUNCTIVITIS OF BOTH EYES, UNSPECIFIED ACUTE CONJUNCTIVITIS TYPE: Primary | ICD-10-CM

## 2021-12-09 RX ORDER — GENTAMICIN SULFATE 3 MG/ML
1 SOLUTION/ DROPS OPHTHALMIC 4 TIMES DAILY
Qty: 2.5 ML | Refills: 0 | Status: SHIPPED | OUTPATIENT
Start: 2021-12-09 | End: 2021-12-16

## 2021-12-09 NOTE — TELEPHONE ENCOUNTER
Patient said her eye has stuff coming out and its painful      She wanted to be seen today and wanted to know if someone can fit her in today.      This is after I informed her it was no appts available         Please advise and call

## 2021-12-09 NOTE — TELEPHONE ENCOUNTER
Medicine sent to her pharmacy, [address] [place] [address] in [place]. Orders Placed This Encounter   Medications    gentamicin (GARAMYCIN) 0.3 % ophthalmic solution     Sig: Place 1 drop into both eyes 4 times daily for 7 days     Dispense:  2.5 mL     Refill:  0         No Appt needed.

## 2021-12-20 ENCOUNTER — TELEPHONE (OUTPATIENT)
Dept: INTERNAL MEDICINE CLINIC | Age: 71
End: 2021-12-20

## 2021-12-20 NOTE — TELEPHONE ENCOUNTER
Pt is requesting new Rx    Metformin 500mg   By mouth DAILY     [facility]   Store #: [id number]  Address: [address] [facility]  Phone: [phone]  Fax: [phone]

## 2021-12-21 NOTE — TELEPHONE ENCOUNTER
I think this was routed to [facility] [address] by mistake.

## 2022-03-01 ENCOUNTER — TELEPHONE (OUTPATIENT)
Dept: INTERNAL MEDICINE CLINIC | Age: 72
End: 2022-03-01

## 2022-03-01 ENCOUNTER — TELEMEDICINE (OUTPATIENT)
Dept: INTERNAL MEDICINE CLINIC | Age: 72
End: 2022-03-01
Payer: MEDICARE

## 2022-03-01 DIAGNOSIS — N39.41 URGE INCONTINENCE: Primary | ICD-10-CM

## 2022-03-01 DIAGNOSIS — I10 PRIMARY HYPERTENSION: ICD-10-CM

## 2022-03-01 DIAGNOSIS — M15.9 OSTEOARTHRITIS OF MULTIPLE JOINTS, UNSPECIFIED OSTEOARTHRITIS TYPE: ICD-10-CM

## 2022-03-01 DIAGNOSIS — N39.41 URGE INCONTINENCE OF URINE: ICD-10-CM

## 2022-03-01 DIAGNOSIS — E55.9 VITAMIN D DEFICIENCY: ICD-10-CM

## 2022-03-01 DIAGNOSIS — E53.8 VITAMIN B 12 DEFICIENCY: ICD-10-CM

## 2022-03-01 DIAGNOSIS — E11.9 TYPE 2 DIABETES MELLITUS WITHOUT COMPLICATION, WITHOUT LONG-TERM CURRENT USE OF INSULIN (HCC): ICD-10-CM

## 2022-03-01 PROBLEM — E11.40 TYPE 2 DIABETES MELLITUS WITH DIABETIC NEUROPATHY (HCC): Status: RESOLVED | Noted: 2021-09-16 | Resolved: 2022-03-01

## 2022-03-01 PROBLEM — M16.11 OSTEOARTHRITIS OF RIGHT HIP: Status: RESOLVED | Noted: 2019-04-19 | Resolved: 2022-03-01

## 2022-03-01 PROCEDURE — 99214 OFFICE O/P EST MOD 30 MIN: CPT | Performed by: INTERNAL MEDICINE

## 2022-03-01 PROCEDURE — 2022F DILAT RTA XM EVC RTNOPTHY: CPT | Performed by: INTERNAL MEDICINE

## 2022-03-01 PROCEDURE — 3017F COLORECTAL CA SCREEN DOC REV: CPT | Performed by: INTERNAL MEDICINE

## 2022-03-01 PROCEDURE — 4040F PNEUMOC VAC/ADMIN/RCVD: CPT | Performed by: INTERNAL MEDICINE

## 2022-03-01 PROCEDURE — 1123F ACP DISCUSS/DSCN MKR DOCD: CPT | Performed by: INTERNAL MEDICINE

## 2022-03-01 PROCEDURE — G8427 DOCREV CUR MEDS BY ELIG CLIN: HCPCS | Performed by: INTERNAL MEDICINE

## 2022-03-01 PROCEDURE — 0509F URINE INCON PLAN DOCD: CPT | Performed by: INTERNAL MEDICINE

## 2022-03-01 PROCEDURE — G8400 PT W/DXA NO RESULTS DOC: HCPCS | Performed by: INTERNAL MEDICINE

## 2022-03-01 PROCEDURE — 3046F HEMOGLOBIN A1C LEVEL >9.0%: CPT | Performed by: INTERNAL MEDICINE

## 2022-03-01 PROCEDURE — 1090F PRES/ABSN URINE INCON ASSESS: CPT | Performed by: INTERNAL MEDICINE

## 2022-03-01 ASSESSMENT — PATIENT HEALTH QUESTIONNAIRE - PHQ9
2. FEELING DOWN, DEPRESSED OR HOPELESS: 1
SUM OF ALL RESPONSES TO PHQ QUESTIONS 1-9: 2
SUM OF ALL RESPONSES TO PHQ QUESTIONS 1-9: 2
1. LITTLE INTEREST OR PLEASURE IN DOING THINGS: 1
SUM OF ALL RESPONSES TO PHQ QUESTIONS 1-9: 2
SUM OF ALL RESPONSES TO PHQ QUESTIONS 1-9: 2
SUM OF ALL RESPONSES TO PHQ9 QUESTIONS 1 & 2: 2

## 2022-03-01 ASSESSMENT — ENCOUNTER SYMPTOMS
SHORTNESS OF BREATH: 0
WHEEZING: 0

## 2022-03-01 NOTE — TELEPHONE ENCOUNTER
----- Message from [place] sent at 3/1/2022 11:46 AM EST -----  Subject: Referral Request    QUESTIONS   Reason for referral request? Patient is needing to send over a request for   lab work to [address] [facility]. Phone [phone]. Has the physician seen you for this condition before? No   Preferred Specialist (if applicable)? Teo Joy  Do you already have an appointment scheduled? No  Additional Information for Provider? Please call patient to advise.   ---------------------------------------------------------------------------  --------------  CALL BACK INFO  What is the best way for the office to contact you? OK to leave message on   voicemail  Preferred Call Back Phone Number?  [phone]

## 2022-03-01 NOTE — PROGRESS NOTES
CAPS Take 1 capsule by mouth daily 90 capsule 3    albuterol sulfate HFA (PROVENTIL HFA) 108 (90 Base) MCG/ACT inhaler Inhale 2 puffs into the lungs every 6 hours as needed for Wheezing 3 Inhaler 3    blood glucose monitor kit and supplies Dispense sufficient amount for indicated testing frequency plus additional to accommodate PRN testing needs. Dispense all needed supplies to include: monitor, strips, lancing device, lancets, control solutions, alcohol swabs. 1 kit 0    blood glucose monitor strips Test 1 times a day  2 hours after dinner daily & as needed for symptoms of irregular blood glucose. Dispense : 100. 100 strip 3    albuterol (PROVENTIL) (2.5 MG/3ML) 0.083% nebulizer solution Take 3 mLs by nebulization every 6 hours as needed for Wheezing 120 each 3    Nebulizers (COMPRESSOR/NEBULIZER) MISC 1 each by Does not apply route every 4-6 hours as needed (shortness of breath) 1 each 5    Cyanocobalamin 2500 MCG SUBL Place 1 tablet under the tongue daily 90 tablet 4    Incontinence Supply Disposable (DEPEND OVERNIGHT BRIEFS LARGE) MISC 1 Device by Does not apply route three times daily. 100 each 2    Fish Oil-Cholecalciferol (FISH OIL + D3 PO) Take 1 tablet by mouth daily       aspirin 81 MG tablet Take 1 tablet by mouth daily. 90 tablet 4    fluticasone (FLONASE) 50 MCG/ACT nasal spray USE 1 SPRAY INTO NOSTRILS TWICE DAILY (Patient not taking: Reported on 3/1/2022) 3 Bottle 3     No current facility-administered medications on file prior to visit. Allergies   Allergen Reactions    Beta Adrenergic Blockers Shortness Of Breath     And altered mental status  Disorientation/ asthma attack  And altered mental status    Amoxicillin Diarrhea and Nausea And Vomiting    Other Other (See Comments)    Brompheniramine-Pseudoeph     Gabapentin Other (See Comments)     Dysarthria and confusion  And leg edema.  No Known Allergies     Penicillins     Rondec      Dizziness;  \"spinning\".        Past Medical History:   Diagnosis Date    Allergic rhinitis     Asthma     Chronic back pain     Hyperlipidemia     Hypertension     Liver disease     \"fatty liver\"    Mitral valve prolapse     Neuropathy     Obesity     Osteoarthritis     Type II or unspecified type diabetes mellitus without mention of complication, not stated as uncontrolled     Unspecified sleep apnea     Urinary incontinence        Patient Active Problem List   Diagnosis    Vitamin D deficiency    Allergic rhinitis    Snoring    Decreased hearing    Urinary incontinence    Vitamin B 12 deficiency    Menopause    Vaginal atrophy    Morbid obesity with BMI of 60.0-69.9, adult (HCC)    Asthma    Back pain    Sciatica of right side    Mixed hyperlipidemia    Type 2 diabetes mellitus without complication, without long-term current use of insulin (HCC)    Abnormal EKG    Osteoarthrosis involving multiple sites    Hypertension       Past Surgical History:   Procedure Laterality Date    COLONOSCOPY      2004 Dr Maldonado Abraham ARTHROSCOPY      left knee       Social History     Socioeconomic History    Marital status:      Spouse name: Not on file    Number of children: Not on file    Years of education: Not on file    Highest education level: Not on file   Occupational History    Not on file   Tobacco Use    Smoking status: Never Smoker    Smokeless tobacco: Never Used   Substance and Sexual Activity    Alcohol use: No    Drug use: No    Sexual activity: Yes     Partners: Male   Other Topics Concern    Not on file   Social History Narrative    Not on file     Social Determinants of Health     Financial Resource Strain: Low Risk     Difficulty of Paying Living Expenses: Not hard at all   Food Insecurity: No Food Insecurity    Worried About Running Out of Food in the Last Year: Never true    Gretchen of Food in the Last Year: Never true   Transportation Needs:     Lack of Transportation (Medical):  Not on file  Lack of Transportation (Non-Medical): Not on file   Physical Activity:     Days of Exercise per Week: Not on file    Minutes of Exercise per Session: Not on file   Stress:     Feeling of Stress : Not on file   Social Connections:     Frequency of Communication with Friends and Family: Not on file    Frequency of Social Gatherings with Friends and Family: Not on file    Attends [employer] Services: Not on file    Active Member of [address] [place] [address] [employer] or Organizations: Not on file    Attends Club or Organization Meetings: Not on file    Marital Status: Not on file   Intimate Partner Violence:     Fear of Current or Ex-Partner: Not on file    Emotionally Abused: Not on file    Physically Abused: Not on file    Sexually Abused: Not on file   Housing Stability:     Unable to Pay for Housing in the Last Year: Not on file    Number of [place] in the Last Year: Not on file    Unstable Housing in the Last Year: Not on file        Family History   Problem Relation Age of Onset    High Blood Pressure Mother     Heart Disease Mother     Stroke Mother     Asthma Mother     Arthritis Mother     High Blood Pressure Father     Arthritis Father     Cancer Sister         breast at 37.  High Blood Pressure Sister     Heart Disease Sister     Cancer Brother         lung cancer and smoker.  Mental Illness Brother         schizophrenia    Diabetes type 2  Brother     Heart Disease Brother         pacemaker    Diabetes Brother     Diabetes Brother         prediabetes    Diabetes type 2  Brother     Hypertension Sister        PE:  Patient-Reported Vitals 3/1/2022   Patient-Reported Weight 330#   Patient-Reported Height 66\"   Patient-Reported Systolic -   Patient-Reported Diastolic -   Patient-Reported Pulse -   Patient-Reported Temperature -   Patient-Reported SpO2 -      There were no vitals filed for this visit.      Constitutional: [x] Appears well-developed and well-nourished [x] No apparent distress [] Abnormal-   Mental status  [x] Alert and awake  [x] Oriented to person/place/time [x]Able to follow commands      Eyes:  EOM    [x]  Normal  [] Abnormal-  Sclera  [x]  Normal  [] Abnormal -         Discharge [x]  None visible  [] Abnormal -    HENT:   [x] Normocephalic, atraumatic. [] Abnormal   [x] Mouth/Throat: Mucous membranes are moist.     External Ears [x] Normal  [] Abnormal-     Neck: [x] No visualized mass     Pulmonary/Chest: [x] Respiratory effort normal.  [x] No visualized signs of difficulty breathing or respiratory distress        [] Abnormal-      Musculoskeletal:   [] Normal gait with no signs of ataxia         [x] Normal range of motion of neck        [] Abnormal-       Neurological:        [x] No Facial Asymmetry (Cranial nerve 7 motor function) (limited exam to video visit)          [x] No gaze palsy        [] Abnormal-         Skin:        [x] No significant exanthematous lesions or discoloration noted on facial skin         [] Abnormal-            Psychiatric:       [x] Normal Affect [x] No Hallucinations        [] Abnormal-     Physical Exam  Constitutional:       General: She is not in acute distress. Appearance: Normal appearance. She is not ill-appearing, toxic-appearing or diaphoretic. HENT:      Head: Normocephalic and atraumatic. Nose: Nose normal.   Eyes:      Extraocular Movements: Extraocular movements intact. Conjunctiva/sclera: Conjunctivae normal.      Pupils: Pupils are equal, round, and reactive to light. Pulmonary:      Effort: Pulmonary effort is normal. No respiratory distress. Musculoskeletal:         General: Normal range of motion. Cervical back: Normal range of motion and neck supple. Right lower leg: No edema. Left lower leg: No edema. Skin:     Coloration: Skin is not jaundiced or pale. Neurological:      Mental Status: She is alert and oriented to person, place, and time.    Psychiatric:         Mood and Affect: Mood normal. Behavior: Behavior normal.         ASSESSMENT/ PLAN:  Type 2 diabetes mellitus without complication, without long-term current use of insulin (HCC)  Unclear control, has not completed labs since 5/19.  -Discussed the importance of diabetes monitoring and potential complications. Repeat A1c now (she will let us know if can be done in [place]), goal A1c<7%  -Continue Metformin 500 mg daily for now  -Reported home BP within goal, on ARB  -Repeat lipid panel, continue Crestor 40 mg  -Due for eye and foot exam  -Low carb diet and regular exercise as tolerated, discussed importance of weight loss. -Repeat BMP, urine for microalbumin  -Follow-up in person when she is back    Vitamin B 12 deficiency  -Repeat B12    Vitamin D deficiency  -Repeat vitamin D    Urinary incontinence  Today reports urinary frequency. Potentially associated to uncontrolled diabetes, no signs of infection reported. Denies hematuria. She is on HCTZ but is not taking regularly due to increased urination  -UA  -DM management as above, repeat A1c    Hypertension  Well controlled per reported home BPs  -Continue amlodipine 10 mg and losartan 100 mg. She takes HCTZ irregular  -BMP  -Needs sleep study    Osteoarthrosis involving multiple sites  Likely OA. Hx and timeline less consistent with inflammatory joint at this point (unable to examine given VV). Hx of OA documented as well.   -try tylenol 1000mg q8 h prn (prefer over NASIDs)  -can try diclofenac gel  -we discussed wt loss can help with OA symptoms  -pt currently visiting her daughter in [facility]. Unable to obtain imaging or exam. Explained if worsening pain/redness swelling will need to be seen there.        Orders Placed This Encounter   Procedures    Urinalysis with Microscopic    Hemoglobin A1C    Lipid Panel    Microalbumin / Creatinine Urine Ratio    Basic Metabolic Panel    Vitamin B12 & Folate    Vitamin D 25 Hydroxy     No orders of the defined types were placed in this encounter. Medications Discontinued During This Encounter   Medication Reason    diclofenac sodium (VOLTAREN) 1 % GEL Therapy completed        No follow-ups on file. Kate Jones MD      Mj Rios is a 66 y.o. female being evaluated by a Virtual Visit (video visit) encounter to address concerns as mentioned above. A caregiver was present when appropriate. Due to this being a TeleHealth encounter (During DHROK-73 public health emergency), evaluation of the following organ systems was limited: Vitals/Constitutional/EENT/Resp/CV/GI//MS/Neuro/Skin/Heme-Lymph-Imm. Pursuant to the emergency declaration under the [address] [place] [address], [address] [place] [address] authority and the [employer] Act, this Virtual Visit was conducted with patient's (and/or legal guardian's) consent, to reduce the patient's risk of exposure to COVID-19 and provide necessary medical care. The patient (and/or legal guardian) has also been advised to contact this office for worsening conditions or problems, and seek emergency medical treatment and/or call 911 if deemed necessary. Patient identification was verified at the start of the visit: Yes    Total time spent on this encounter: not billed by time    Services were provided through a video synchronous discussion virtually to substitute for in-person clinic visit. Patient and provider were located at their individual homes. --Kate Jones MD on 3/1/2022 at 9:12 PM    An electronic signature was used to authenticate this note. This dictation was generated by voice recognition computer software. Although all attempts are made to edit the dictation for accuracy, there may be errors in the transcription that are not intended.

## 2022-03-02 NOTE — ASSESSMENT & PLAN NOTE
Likely OA. Hx and timeline less consistent with inflammatory joint at this point (unable to examine given VV). Hx of OA documented as well.   -try tylenol 1000mg q8 h prn (prefer over NASIDs)  -can try diclofenac gel  -we discussed wt loss can help with OA symptoms  -pt currently visiting her daughter in [facility]. Unable to obtain imaging or exam. Explained if worsening pain/redness swelling will need to be seen there.

## 2022-03-02 NOTE — ASSESSMENT & PLAN NOTE
Unclear control, has not completed labs since 5/19.  -Discussed the importance of diabetes monitoring and potential complications. Repeat A1c now (she will let us know if can be done in [place]), goal A1c<7%  -Continue Metformin 500 mg daily for now  -Reported home BP within goal, on ARB  -Repeat lipid panel, continue Crestor 40 mg  -Due for eye and foot exam  -Low carb diet and regular exercise as tolerated, discussed importance of weight loss.   -Repeat BMP, urine for microalbumin  -Follow-up in person when she is back

## 2022-03-02 NOTE — ASSESSMENT & PLAN NOTE
Well controlled per reported home BPs  -Continue amlodipine 10 mg and losartan 100 mg.   She takes HCTZ irregular  -BMP  -Needs sleep study

## 2022-03-02 NOTE — ASSESSMENT & PLAN NOTE
Today reports urinary frequency. Potentially associated to uncontrolled diabetes, no signs of infection reported. Denies hematuria.   She is on HCTZ but is not taking regularly due to increased urination  -UA  -DM management as above, repeat A1c

## 2022-04-12 ENCOUNTER — TELEPHONE (OUTPATIENT)
Dept: INTERNAL MEDICINE CLINIC | Age: 72
End: 2022-04-12

## 2022-04-12 NOTE — TELEPHONE ENCOUNTER
----- Message from Jakub Harshil sent at 4/12/2022  3:12 PM EDT -----  Subject: Message to Provider    QUESTIONS  Information for Provider? Patient is wanting to schedule appointment VV.   but patient is in [place] and we cant schedule patients that arent in [facility]  ---------------------------------------------------------------------------  --------------  CALL BACK INFO  What is the best way for the office to contact you? OK to leave message on   voicemail  Preferred Call Back Phone Number? [phone]  ---------------------------------------------------------------------------  --------------  SCRIPT ANSWERS  Relationship to Patient?  Self

## 2022-04-13 NOTE — TELEPHONE ENCOUNTER
She is only there visiting her daughter, I believe I can see her (verify with Leah please). If so can schedule a VV.

## 2022-04-26 ENCOUNTER — TELEMEDICINE (OUTPATIENT)
Dept: INTERNAL MEDICINE CLINIC | Age: 72
End: 2022-04-26
Payer: MEDICARE

## 2022-04-26 DIAGNOSIS — Z12.11 COLON CANCER SCREENING: Primary | ICD-10-CM

## 2022-04-26 DIAGNOSIS — Z12.31 ENCOUNTER FOR SCREENING MAMMOGRAM FOR MALIGNANT NEOPLASM OF BREAST: ICD-10-CM

## 2022-04-26 DIAGNOSIS — J01.01 ACUTE RECURRENT MAXILLARY SINUSITIS: ICD-10-CM

## 2022-04-26 DIAGNOSIS — E11.9 TYPE 2 DIABETES MELLITUS WITHOUT COMPLICATION, WITHOUT LONG-TERM CURRENT USE OF INSULIN (HCC): ICD-10-CM

## 2022-04-26 DIAGNOSIS — I10 PRIMARY HYPERTENSION: ICD-10-CM

## 2022-04-26 DIAGNOSIS — J45.30 MILD PERSISTENT ASTHMA, UNSPECIFIED WHETHER COMPLICATED: ICD-10-CM

## 2022-04-26 PROCEDURE — G8400 PT W/DXA NO RESULTS DOC: HCPCS | Performed by: INTERNAL MEDICINE

## 2022-04-26 PROCEDURE — 2022F DILAT RTA XM EVC RTNOPTHY: CPT | Performed by: INTERNAL MEDICINE

## 2022-04-26 PROCEDURE — 3046F HEMOGLOBIN A1C LEVEL >9.0%: CPT | Performed by: INTERNAL MEDICINE

## 2022-04-26 PROCEDURE — 3017F COLORECTAL CA SCREEN DOC REV: CPT | Performed by: INTERNAL MEDICINE

## 2022-04-26 PROCEDURE — G8427 DOCREV CUR MEDS BY ELIG CLIN: HCPCS | Performed by: INTERNAL MEDICINE

## 2022-04-26 PROCEDURE — 1123F ACP DISCUSS/DSCN MKR DOCD: CPT | Performed by: INTERNAL MEDICINE

## 2022-04-26 PROCEDURE — 99214 OFFICE O/P EST MOD 30 MIN: CPT | Performed by: INTERNAL MEDICINE

## 2022-04-26 PROCEDURE — 4040F PNEUMOC VAC/ADMIN/RCVD: CPT | Performed by: INTERNAL MEDICINE

## 2022-04-26 PROCEDURE — 1090F PRES/ABSN URINE INCON ASSESS: CPT | Performed by: INTERNAL MEDICINE

## 2022-04-26 RX ORDER — FLUTICASONE PROPIONATE 50 MCG
SPRAY, SUSPENSION (ML) NASAL
Qty: 1 EACH | Refills: 5 | Status: SHIPPED | OUTPATIENT
Start: 2022-04-26

## 2022-04-26 ASSESSMENT — ENCOUNTER SYMPTOMS
EYE ITCHING: 1
SHORTNESS OF BREATH: 0
EYE DISCHARGE: 1

## 2022-04-26 NOTE — ASSESSMENT & PLAN NOTE
- Last visit we stopped Symbicort as she was using it as needed instead of daily.   Now on albuterol as needed only, reports over the last month increased use of twice weekly due to allergies  -Add Flonase nasal spray  -Continue albuterol as needed  -Continue Singulair

## 2022-04-26 NOTE — ASSESSMENT & PLAN NOTE
Unclear control, will send me a message on my child with home reading  -Continue amlodipine 10 mg and losartan 100 mg, HCTZ  -BMP  -Needs sleep study when she is back in [place]

## 2022-04-26 NOTE — PROGRESS NOTES
[address] Internal Medicine  2022      TELEHEALTH EVALUATION -- Audio/Visual (During UKHKU-52 public health emergency)    Elfego Vergara (:  1950) has requested an audio/video evaluation for the following concern(s):    Chief Complaint   Patient presents with    Hypertension     follow up     Diabetes    Allergies       ASSESSMENT/ PLAN:    Type 2 diabetes mellitus without complication, without long-term current use of insulin ([address]  Unclear control, has not completed labs since .  -Discussed the importance of diabetes monitoring and potential complications. Repeat A1c now (we will fax labs to lab in [place]), goal A1c<7%  -Continue Metformin 500 mg daily for now  -Unclear home BP control, she will send me or Serene, on ARB  -Repeat lipid panel, continue Crestor 40 mg  -Due for eye and foot exam  -Low carb diet and regular exercise as tolerated  -Repeat BMP, urine for microalbumin  -Follow-up in person when she is back in     Hypertension  Unclear control, will send me a message on my child with home reading  -Continue amlodipine 10 mg and losartan 100 mg, HCTZ  -BMP  -Needs sleep study when she is back in [address] [place] [address] visit we stopped Symbicort as she was using it as needed instead of daily.   Now on albuterol as needed only, reports over the last month increased use of twice weekly due to allergies  -Add Flonase nasal spray  -Continue albuterol as needed  -Continue Singulair      Orders Placed This Encounter   Procedures   Mariella Garcia CAD BILATERAL    Emeka Soliman MD, Gastroenterology, [place]     Orders Placed This Encounter   Medications    fluticasone (FLONASE) 50 MCG/ACT nasal spray     Sig: USE 1 SPRAY INTO NOSTRILS TWICE DAILY     Dispense:  1 each     Refill:  5     **Patient requests 90 days supply**      Medications Discontinued During This Encounter   Medication Reason    fluticasone (FLONASE) 50 MCG/ACT nasal spray Therapy completed    Fish Oil-Cholecalciferol (FISH OIL + D3 PO) Therapy completed    Cyanocobalamin 2500 MCG SUBL Therapy completed        No follow-ups on file. HPI    Virtual follow-up, still in [place], moved back to [place] in June. Did not complete labs yet. Again reports increased urination, no dysuria, no incontinence. Allergies are worse, also with eye itching and discharge. Uses inhaler more frequently due to allergies, now twice a week. HISTORIES   Current Outpatient Medications on File Prior to Visit   Medication Sig Dispense Refill    metFORMIN (GLUCOPHAGE) 500 MG tablet Take 1 tablet by mouth daily 90 tablet 0    amLODIPine (NORVASC) 10 MG tablet Take 1 tablet by mouth daily 30 tablet 0    hydroCHLOROthiazide (HYDRODIURIL) 25 MG tablet TAKE 1 TABLET EVERY DAY 30 tablet 0    losartan (COZAAR) 100 MG tablet Take 1 tablet by mouth daily 30 tablet 0    rosuvastatin (CRESTOR) 40 MG tablet Take 1 tablet by mouth daily TAKE 1 TABLET BY MOUTH EVERY NIGHT. STOP CRESTOR 20 MG 30 tablet 0    montelukast (SINGULAIR) 10 MG tablet Take 1 tablet by mouth daily 30 tablet 0    Cholecalciferol (VITAMIN D3) 125 MCG (5000 UT) CAPS Take 1 capsule by mouth daily 90 capsule 3    albuterol sulfate HFA (PROVENTIL HFA) 108 (90 Base) MCG/ACT inhaler Inhale 2 puffs into the lungs every 6 hours as needed for Wheezing 3 Inhaler 3    blood glucose monitor kit and supplies Dispense sufficient amount for indicated testing frequency plus additional to accommodate PRN testing needs. Dispense all needed supplies to include: monitor, strips, lancing device, lancets, control solutions, alcohol swabs. 1 kit 0    blood glucose monitor strips Test 1 times a day  2 hours after dinner daily & as needed for symptoms of irregular blood glucose.  Dispense : 100. 100 strip 3    albuterol (PROVENTIL) (2.5 MG/3ML) 0.083% nebulizer solution Take 3 mLs by nebulization every 6 hours as needed for Wheezing 120 each 3    Nebulizers (COMPRESSOR/NEBULIZER) MISC 1 each by Does not apply route every 4-6 hours as needed (shortness of breath) 1 each 5    Incontinence Supply Disposable (DEPEND OVERNIGHT BRIEFS LARGE) MISC 1 Device by Does not apply route three times daily. 100 each 2    aspirin 81 MG tablet Take 1 tablet by mouth daily. (Patient not taking: Reported on 4/26/2022) 90 tablet 4     No current facility-administered medications on file prior to visit. Allergies   Allergen Reactions    Beta Adrenergic Blockers Shortness Of Breath     And altered mental status  Disorientation/ asthma attack  And altered mental status    Amoxicillin Diarrhea and Nausea And Vomiting    Other Other (See Comments)    Brompheniramine-Pseudoeph     Gabapentin Other (See Comments)     Dysarthria and confusion  And leg edema.  No Known Allergies     Penicillins     Rondec      Dizziness;  \"spinning\".      Past Medical History:   Diagnosis Date    Allergic rhinitis     Asthma     Chronic back pain     Hyperlipidemia     Hypertension     Liver disease     \"fatty liver\"    Mitral valve prolapse     Neuropathy     Obesity     Osteoarthritis     Type II or unspecified type diabetes mellitus without mention of complication, not stated as uncontrolled     Unspecified sleep apnea     Urinary incontinence      Patient Active Problem List   Diagnosis    Vitamin D deficiency    Allergic rhinitis    Snoring    Decreased hearing    Urinary incontinence    Vitamin B 12 deficiency    Menopause    Vaginal atrophy    Morbid obesity with BMI of 60.0-69.9, adult (HCC)    Asthma    Back pain    Sciatica of right side    Mixed hyperlipidemia    Type 2 diabetes mellitus without complication, without long-term current use of insulin (HCC)    Abnormal EKG    Osteoarthrosis involving multiple sites    Hypertension     Past Surgical History:   Procedure Laterality Date    COLONOSCOPY      2004 Dr Maldonado Bah Plasjustin ARTHROSCOPY      left knee     Social History     Socioeconomic History    Marital status:      Spouse name: Not on file    Number of children: Not on file    Years of education: Not on file    Highest education level: Not on file   Occupational History    Not on file   Tobacco Use    Smoking status: Never Smoker    Smokeless tobacco: Never Used   Substance and Sexual Activity    Alcohol use: No    Drug use: No    Sexual activity: Yes     Partners: Male   Other Topics Concern    Not on file   Social History Narrative    Not on file     Social Determinants of Health     Financial Resource Strain: Low Risk     Difficulty of Paying Living Expenses: Not hard at all   Food Insecurity: No Food Insecurity    Worried About [address] [employer] in the Last Year: Never true    [address] [facility] in the Last Year: Never true   Transportation Needs:     Lack of Transportation (Medical): Not on file    Lack of Transportation (Non-Medical):  Not on file   Physical Activity:     Days of Exercise per Week: Not on file    Minutes of Exercise per Session: Not on file   Stress:     Feeling of Stress : Not on file   Social Connections:     Frequency of Communication with Friends and Family: Not on file    Frequency of Social Gatherings with Friends and Family: Not on file    Attends [employer] Services: Not on file    Active Member of [address] [place] [address] Wealth Access or Organizations: Not on file    Attends Club or Organization Meetings: Not on file    Marital Status: Not on file   Intimate Partner Violence:     Fear of Current or Ex-Partner: Not on file    Emotionally Abused: Not on file    Physically Abused: Not on file    Sexually Abused: Not on file   Housing Stability:     Unable to Pay for Housing in the Last Year: Not on file    Number of [place] in the Last Year: Not on file    Unstable Housing in the Last Year: Not on file      Family History   Problem Relation Age of Onset    High Blood Pressure Mother     Heart Disease Mother    Kay Stroke Mother     Asthma Mother     Arthritis Mother     High Blood Pressure Father     Arthritis Father     Cancer Sister         breast at 37.  High Blood Pressure Sister     Heart Disease Sister     Cancer Brother         lung cancer and smoker.  Mental Illness Brother         schizophrenia    Diabetes type 2  Brother     Heart Disease Brother         pacemaker    Diabetes Brother     Diabetes Brother         prediabetes    Diabetes type 2  Brother     Hypertension Sister        ROS  Review of Systems   Eyes: Positive for discharge and itching. Respiratory: Negative for shortness of breath. Cardiovascular: Negative for chest pain. Genitourinary: Positive for frequency. Allergic/Immunologic: Positive for environmental allergies. PE:  Patient-Reported Vitals 4/26/2022   Patient-Reported Weight 334#   Patient-Reported Height 65.5\"   Patient-Reported Systolic -   Patient-Reported Diastolic -   Patient-Reported Pulse -   Patient-Reported Temperature 97.2   Patient-Reported SpO2 -      There were no vitals filed for this visit. Constitutional: [x] Appears well-developed and well-nourished [x] No apparent distress      [] Abnormal-   Mental status  [x] Alert and awake  [x] Oriented to person/place/time [x]Able to follow commands      Eyes:  EOM    [x]  Normal  [] Abnormal-  Sclera  [x]  Normal  [] Abnormal -         Discharge [x]  None visible  [] Abnormal -    HENT:   [x] Normocephalic, atraumatic.   [] Abnormal   [x] Mouth/Throat: Mucous membranes are moist.     External Ears [x] Normal  [] Abnormal-     Neck: [x] No visualized mass     Pulmonary/Chest: [x] Respiratory effort normal.  [x] No visualized signs of difficulty breathing or respiratory distress        [] Abnormal-      Musculoskeletal:   [] Normal gait with no signs of ataxia         [x] Normal range of motion of neck        [] Abnormal-       Neurological:        [x] No Facial Asymmetry (Cranial nerve 7 motor function) (limited exam to video visit)          [x] No gaze palsy        [] Abnormal-         Skin:        [x] No significant exanthematous lesions or discoloration noted on facial skin         [] Abnormal-            Psychiatric:       [x] Normal Affect [x] No Hallucinations        [] Abnormal-     Physical Exam      Karyle Spikes, MD      Serina Samaniego is a 66 y.o. female being evaluated by a Virtual Visit (video visit) encounter to address concerns as mentioned above. A caregiver was present when appropriate. Due to this being a TeleHealth encounter (During [facility] public health emergency), evaluation of the following organ systems was limited: Vitals/Constitutional/EENT/Resp/CV/GI//MS/Neuro/Skin/Heme-Lymph-Imm. Pursuant to the emergency declaration under the [address] [place] [address], [address] [place] [address] authority and the [employer] and [employer] Act, this Virtual Visit was conducted with patient's (and/or legal guardian's) consent, to reduce the patient's risk of exposure to COVID-19 and provide necessary medical care. The patient (and/or legal guardian) has also been advised to contact this office for worsening conditions or problems, and seek emergency medical treatment and/or call 911 if deemed necessary. Patient identification was verified at the start of the visit: Yes    Total time spent on this encounter: not billed by time    Services were provided through a video synchronous discussion virtually to substitute for in-person clinic visit. Patient and provider were located at their individual homes. --Karyle Spikes, MD on 4/26/2022 at 5:05 PM    An electronic signature was used to authenticate this note. This dictation was generated by voice recognition computer software. Although all attempts are made to edit the dictation for accuracy, there may be errors in the transcription that are not intended.

## 2022-04-26 NOTE — ASSESSMENT & PLAN NOTE
Unclear control, has not completed labs since 5/19.  -Discussed the importance of diabetes monitoring and potential complications.   Repeat A1c now (we will fax labs to lab in [place]), goal A1c<7%  -Continue Metformin 500 mg daily for now  -Unclear home BP control, she will send me or Theresat, on ARB  -Repeat lipid panel, continue Crestor 40 mg  -Due for eye and foot exam  -Low carb diet and regular exercise as tolerated  -Repeat BMP, urine for microalbumin  -Follow-up in person when she is back in June

## 2022-04-27 ENCOUNTER — TELEPHONE (OUTPATIENT)
Dept: INTERNAL MEDICINE CLINIC | Age: 72
End: 2022-04-27

## 2022-04-27 NOTE — TELEPHONE ENCOUNTER
Pt says while walking to the bathroom all of her bladder just came out and she wet herself.  She couldn't stop it and it wasn't just a little it was all of it and she said this has never happened before so she is concerned and would like to know what she should do or if there is anything she can do             Please call and advise

## 2022-05-07 ENCOUNTER — TELEPHONE (OUTPATIENT)
Dept: INTERNAL MEDICINE CLINIC | Age: 72
End: 2022-05-07

## 2022-05-07 NOTE — TELEPHONE ENCOUNTER
She is living in [place], and has not had any recent testing, but is urinating more frequently, and today felt she had to urinate, started walking to the bathroom and her urine flow let loose before she got there twice. She denied any hematuria, dysuria or fever. I told her to go to urgent care to get her blood and urine checked. She should stop drinking sweet liquids and especially orange juice. She thought she was pre diabetic, but has not been checking her sugar or seeing any doctor for the last few years.

## 2022-05-20 ENCOUNTER — TELEPHONE (OUTPATIENT)
Dept: INTERNAL MEDICINE CLINIC | Age: 72
End: 2022-05-20

## 2022-05-20 NOTE — TELEPHONE ENCOUNTER
Pt has been having a lot of discharge near her eyes and they have began to droop. Pt states that this has been intermitten because of the weather. Pt believes it is her allergies. [id number] [place], [address]        Please advise and call.

## 2022-05-23 ENCOUNTER — TELEPHONE (OUTPATIENT)
Dept: INTERNAL MEDICINE CLINIC | Age: 72
End: 2022-05-23

## 2022-05-23 DIAGNOSIS — Z78.0 MENOPAUSE: Primary | ICD-10-CM

## 2022-05-23 NOTE — TELEPHONE ENCOUNTER
Patient is in [place] and would like to get her Mammogram and bone density test done there because she won't be back until the second week of June. Please send the orders to:     [address]  [address] [place] [address]    Fax:  [phone]    Phone: [phone]    Please call and advise

## 2022-05-23 NOTE — TELEPHONE ENCOUNTER
The patient asked that we send orders for her mammogram and bone density test to an imaging center in [address] [place] [address]. The orders were faxed to the [facility]. I informed the patient that the patient that her insurance may not cover her two tests. She wanted to know why, I informed her that Dr. Noah James isn't licensed to practice medicine in the [facility]. She understood what I meant what I was saying at that point.

## 2022-06-07 ENCOUNTER — TELEPHONE (OUTPATIENT)
Dept: INTERNAL MEDICINE CLINIC | Age: 72
End: 2022-06-07

## 2022-06-07 NOTE — TELEPHONE ENCOUNTER
----- Message from [employer] sent at 6/7/2022  2:20 PM EDT -----  Subject: Referral Request    QUESTIONS   Reason for referral request? PT is requesting the most resent result for   her monogram bone density test done 6/7/2022. Please follow up with PT   when results come in . Has the physician seen you for this condition before? Yes  Select a date? 2022-06-07  Select the Provider the patient wants to be referred to, if known (PCP or   Specialist)? Teo Joy   Preferred Specialist (if applicable)? Do you already have an appointment scheduled? No  Additional Information for Provider?   ---------------------------------------------------------------------------  --------------  CALL BACK INFO  What is the best way for the office to contact you? OK to leave message on   voicemail  Preferred Call Back Phone Number? [phone]  ---------------------------------------------------------------------------  --------------  SCRIPT ANSWERS  Relationship to Patient?  Self

## 2022-06-07 NOTE — TELEPHONE ENCOUNTER
I returned the patient's call. I informed her that we haven't gotten her results from her tests that were done today. The patient got her mammogram and dexa scan out of state in [place]. I'm unaware of the practices of out of state medical centers. When we do get her results, that Dr. Jeannie Diaz is still out of the country. We'll have another provider go over her results.

## 2022-06-08 ENCOUNTER — TELEPHONE (OUTPATIENT)
Dept: INTERNAL MEDICINE CLINIC | Age: 72
End: 2022-06-08

## 2022-06-08 NOTE — TELEPHONE ENCOUNTER
----- Message from Jerri Aburto sent at 6/7/2022  3:40 PM EDT -----  Subject: Message to Provider    QUESTIONS  Information for Provider? patient called in today stating that she is   experiencing right shoulder pain when walking with her cane. She would   like to know if there is something she doing wrong or what can she do to   relieve her of the pain. Please contact patient and advise.   ---------------------------------------------------------------------------  --------------  CALL BACK INFO  What is the best way for the office to contact you? OK to leave message on   voicemail  Preferred Call Back Phone Number? [phone]  ---------------------------------------------------------------------------  --------------  SCRIPT ANSWERS  Relationship to Patient?  Self

## 2022-06-08 NOTE — TELEPHONE ENCOUNTER
----- Message from Tia Valenzuela sent at 6/7/2022  3:08 PM EDT -----  Subject: Message to Provider    QUESTIONS  Information for Provider? Patient would like all lab orders faxed to [employer] due to insurance. Phone? [phone] Fax? [phone] Any issue contact   pt.   ---------------------------------------------------------------------------  --------------  [address]  What is the best way for the office to contact you? OK to leave message on   voicemail  Preferred Call Back Phone Number? [phone]  ---------------------------------------------------------------------------  --------------  SCRIPT ANSWERS  Relationship to Patient?  Self

## 2022-06-09 NOTE — TELEPHONE ENCOUNTER
I left a voice mail for the patient to make an appointment. There's nothing that can be done over the phone for shoulder pain.

## 2022-06-09 NOTE — TELEPHONE ENCOUNTER
Pt called back to return a call from office, I told pt that she needed to make an appt but pt stated that she is out of town and will not be back for 2 weeks and I asked her if she would like to make a appt in 2 weeks she said no and would like to speak with medical assistant to see what can be done or she would see a doctor where she is at.           Please call and advise

## 2022-06-13 ENCOUNTER — TELEPHONE (OUTPATIENT)
Dept: INTERNAL MEDICINE CLINIC | Age: 72
End: 2022-06-13

## 2022-06-13 NOTE — TELEPHONE ENCOUNTER
I called to have the patient's results faxed from [place]. I also called the patient and informed her again, that until Dr. Norma Alfonso reviews her records, that we can't give her any results.

## 2022-06-13 NOTE — TELEPHONE ENCOUNTER
Patient said she got her tests done at [facility]per [facility], [place], [address] [place] [address]    Number- [phone]        Please advise

## 2022-06-13 NOTE — TELEPHONE ENCOUNTER
Patient wanted to know the results of her mammogram and bone density exam       Please advise and call

## 2022-06-23 NOTE — TELEPHONE ENCOUNTER
Please remind her we ordered important labs. We do not know how her diabetes is doing. If she now back to [place]?   Can get labs here if so

## 2022-07-05 ENCOUNTER — TELEPHONE (OUTPATIENT)
Dept: OTHER | Facility: CLINIC | Age: 72
End: 2022-07-05

## 2022-07-05 NOTE — TELEPHONE ENCOUNTER
Malik Miranda, Was contacted today as part of [facility] to schedule a mammogram.         Left VM for pt to return call to this nurse regarding routine health screenings. [employer] message also sent.          [place] ORLIN, LPN

## 2022-07-18 NOTE — TELEPHONE ENCOUNTER
[phone] (home)   Spoke with patient she will make appt. X 1wk when transportation bus is in affect. Advised her to call back for appt. As soon as she knows.

## 2022-07-18 NOTE — TELEPHONE ENCOUNTER
Needs diabetes f/u, needs labs completed, can get labs when she comes in if easier instead of before.

## 2022-08-12 DIAGNOSIS — E78.2 MIXED HYPERLIPIDEMIA: ICD-10-CM

## 2022-08-12 DIAGNOSIS — I11.9 MALIGNANT HTN WITH HEART DISEASE, W/O CHF, W/O CHRONIC KIDNEY DISEASE: ICD-10-CM

## 2022-08-12 DIAGNOSIS — J45.40 MODERATE PERSISTENT ASTHMA, UNSPECIFIED WHETHER COMPLICATED: ICD-10-CM

## 2022-08-15 RX ORDER — LOSARTAN POTASSIUM 100 MG/1
100 TABLET ORAL DAILY
Qty: 90 TABLET | Refills: 0 | Status: SHIPPED | OUTPATIENT
Start: 2022-08-15 | End: 2022-10-28

## 2022-08-15 RX ORDER — HYDROCHLOROTHIAZIDE 25 MG/1
TABLET ORAL
Qty: 90 TABLET | Refills: 0 | Status: SHIPPED | OUTPATIENT
Start: 2022-08-15 | End: 2022-10-28

## 2022-08-15 RX ORDER — AMLODIPINE BESYLATE 10 MG/1
10 TABLET ORAL DAILY
Qty: 90 TABLET | Refills: 0 | Status: SHIPPED | OUTPATIENT
Start: 2022-08-15 | End: 2022-10-28

## 2022-08-15 RX ORDER — ROSUVASTATIN CALCIUM 40 MG/1
40 TABLET, COATED ORAL DAILY
Qty: 90 TABLET | Refills: 0 | Status: SHIPPED | OUTPATIENT
Start: 2022-08-15 | End: 2022-08-16 | Stop reason: SDUPTHER

## 2022-08-15 RX ORDER — MONTELUKAST SODIUM 10 MG/1
10 TABLET ORAL DAILY
Qty: 90 TABLET | Refills: 0 | Status: SHIPPED | OUTPATIENT
Start: 2022-08-15 | End: 2022-10-28

## 2022-08-16 ENCOUNTER — TELEPHONE (OUTPATIENT)
Dept: INTERNAL MEDICINE CLINIC | Age: 72
End: 2022-08-16

## 2022-08-16 DIAGNOSIS — E78.2 MIXED HYPERLIPIDEMIA: ICD-10-CM

## 2022-08-16 RX ORDER — ROSUVASTATIN CALCIUM 40 MG/1
40 TABLET, COATED ORAL DAILY
Qty: 90 TABLET | Refills: 0 | Status: SHIPPED | OUTPATIENT
Start: 2022-08-16 | End: 2022-10-28

## 2022-08-16 NOTE — TELEPHONE ENCOUNTER
Patient called concerned with rx Rx (rosuvastatin) 40mg  is that suppose to be twice a day or once a day, she stated she is out of meds. she was out of meds things got misplaced during her move!   PLEASE ADVISE:

## 2022-09-21 ENCOUNTER — TELEPHONE (OUTPATIENT)
Dept: INTERNAL MEDICINE CLINIC | Age: 72
End: 2022-09-21

## 2022-09-21 ENCOUNTER — TELEMEDICINE (OUTPATIENT)
Dept: INTERNAL MEDICINE CLINIC | Age: 72
End: 2022-09-21
Payer: MEDICARE

## 2022-09-21 DIAGNOSIS — E66.01 MORBID OBESITY WITH BMI OF 60.0-69.9, ADULT (HCC): ICD-10-CM

## 2022-09-21 DIAGNOSIS — M54.41 BILATERAL LOW BACK PAIN WITH RIGHT-SIDED SCIATICA, UNSPECIFIED CHRONICITY: Primary | ICD-10-CM

## 2022-09-21 DIAGNOSIS — M15.9 OSTEOARTHRITIS OF MULTIPLE JOINTS, UNSPECIFIED OSTEOARTHRITIS TYPE: ICD-10-CM

## 2022-09-21 DIAGNOSIS — E11.9 TYPE 2 DIABETES MELLITUS WITHOUT COMPLICATION, WITHOUT LONG-TERM CURRENT USE OF INSULIN (HCC): ICD-10-CM

## 2022-09-21 DIAGNOSIS — H10.32 ACUTE CONJUNCTIVITIS OF LEFT EYE, UNSPECIFIED ACUTE CONJUNCTIVITIS TYPE: ICD-10-CM

## 2022-09-21 DIAGNOSIS — M54.41 BILATERAL LOW BACK PAIN WITH RIGHT-SIDED SCIATICA, UNSPECIFIED CHRONICITY: ICD-10-CM

## 2022-09-21 DIAGNOSIS — R26.9 GAIT DISTURBANCE: ICD-10-CM

## 2022-09-21 DIAGNOSIS — I10 PRIMARY HYPERTENSION: ICD-10-CM

## 2022-09-21 PROCEDURE — G8427 DOCREV CUR MEDS BY ELIG CLIN: HCPCS | Performed by: INTERNAL MEDICINE

## 2022-09-21 PROCEDURE — 3046F HEMOGLOBIN A1C LEVEL >9.0%: CPT | Performed by: INTERNAL MEDICINE

## 2022-09-21 PROCEDURE — 2022F DILAT RTA XM EVC RTNOPTHY: CPT | Performed by: INTERNAL MEDICINE

## 2022-09-21 PROCEDURE — G8400 PT W/DXA NO RESULTS DOC: HCPCS | Performed by: INTERNAL MEDICINE

## 2022-09-21 PROCEDURE — 99214 OFFICE O/P EST MOD 30 MIN: CPT | Performed by: INTERNAL MEDICINE

## 2022-09-21 PROCEDURE — 3017F COLORECTAL CA SCREEN DOC REV: CPT | Performed by: INTERNAL MEDICINE

## 2022-09-21 PROCEDURE — 1090F PRES/ABSN URINE INCON ASSESS: CPT | Performed by: INTERNAL MEDICINE

## 2022-09-21 PROCEDURE — 1123F ACP DISCUSS/DSCN MKR DOCD: CPT | Performed by: INTERNAL MEDICINE

## 2022-09-21 RX ORDER — ERYTHROMYCIN 5 MG/G
OINTMENT OPHTHALMIC
Qty: 1 EACH | Refills: 0 | Status: SHIPPED | OUTPATIENT
Start: 2022-09-21 | End: 2022-10-01

## 2022-09-21 SDOH — ECONOMIC STABILITY: FOOD INSECURITY: WITHIN THE PAST 12 MONTHS, YOU WORRIED THAT YOUR FOOD WOULD RUN OUT BEFORE YOU GOT MONEY TO BUY MORE.: NEVER TRUE

## 2022-09-21 SDOH — ECONOMIC STABILITY: FOOD INSECURITY: WITHIN THE PAST 12 MONTHS, THE FOOD YOU BOUGHT JUST DIDN'T LAST AND YOU DIDN'T HAVE MONEY TO GET MORE.: NEVER TRUE

## 2022-09-21 ASSESSMENT — PATIENT HEALTH QUESTIONNAIRE - PHQ9
2. FEELING DOWN, DEPRESSED OR HOPELESS: 1
SUM OF ALL RESPONSES TO PHQ QUESTIONS 1-9: 2
SUM OF ALL RESPONSES TO PHQ9 QUESTIONS 1 & 2: 2
SUM OF ALL RESPONSES TO PHQ QUESTIONS 1-9: 2
SUM OF ALL RESPONSES TO PHQ QUESTIONS 1-9: 2
1. LITTLE INTEREST OR PLEASURE IN DOING THINGS: 1
SUM OF ALL RESPONSES TO PHQ QUESTIONS 1-9: 2

## 2022-09-21 ASSESSMENT — SOCIAL DETERMINANTS OF HEALTH (SDOH): HOW HARD IS IT FOR YOU TO PAY FOR THE VERY BASICS LIKE FOOD, HOUSING, MEDICAL CARE, AND HEATING?: NOT HARD AT ALL

## 2022-09-21 NOTE — TELEPHONE ENCOUNTER
Pt informed the provider will send in medication for her eye. Order for walker placed to be faxed to [facility]  Home health referral faxed for home pt

## 2022-09-21 NOTE — ASSESSMENT & PLAN NOTE
Unclear control, has not completed labs since 5/19.  -Discussed the importance of diabetes monitoring and potential complications.     -Repeat A1c now , goal A1c<7%  -Continue Metformin 500 mg daily for now  -Unclear home BP control, needs in person follow-up, on ARB  -Repeat lipid panel, continue Crestor 40 mg  -Due for eye and foot exam  -Low carb diet and regular exercise as tolerated  -Repeat BMP, urine for microalbumin  -Follow-up in person

## 2022-09-21 NOTE — PROGRESS NOTES
[facility]  2022      TELEHEALTH EVALUATION -- Audio/Visual (During UTJYJ-82 public health emergency)    Mariah Moran (:  1950) has requested an audio/video evaluation for the following concern(s):    Chief Complaint   Patient presents with    Eye Drainage     Left Eye sore and red and crusty outside of eye    Discuss Medications       ASSESSMENT/ PLAN:    Acute conjunctivitis of left eye  Given this is a vv unable to perform physical exam, and video quality is low and I am unable to see a good detailed eye exam.  History consistent with conjunctivitis, likely viral.  Has been persistent for 5 days. She does raise some concerns for sore? Lateral to her left eye but does not describe clear signs of cellulitis to concern for periorbital/orbital cellulitis. We discussed alarm signs to be evaluated immediately in detail.   -Will send empiric topical antibiotics    Type 2 diabetes mellitus without complication, without long-term current use of insulin (HCC)  Unclear control, has not completed labs since .  -Discussed the importance of diabetes monitoring and potential complications. -Repeat A1c now , goal A1c<7%  -Continue Metformin 500 mg daily for now  -Unclear home BP control, needs in person follow-up, on ARB  -Repeat lipid panel, continue Crestor 40 mg  -Due for eye and foot exam  -Low carb diet and regular exercise as tolerated  -Repeat BMP, urine for microalbumin  -Follow-up in person    Hypertension  Unclear control, will come in in person soon  -Continue amlodipine 10 mg and losartan 100 mg, HCTZ  -BMP  -Needs sleep study    Back pain  - Home PT  -Needs a walker      No orders of the defined types were placed in this encounter. No orders of the defined types were placed in this encounter. There are no discontinued medications. No follow-ups on file. HPI  Virtual follow-up, reports left eye redness, drainage (clear) crusty for 5 days.   Feels soreness at amLODIPine (NORVASC) 10 MG tablet Take 1 tablet by mouth in the morning. 90 tablet 0     No current facility-administered medications on file prior to visit. Allergies   Allergen Reactions    Beta Adrenergic Blockers Shortness Of Breath     And altered mental status  Disorientation/ asthma attack  And altered mental status    Amoxicillin Diarrhea and Nausea And Vomiting    Other Other (See Comments)    Brompheniramine-Pseudoeph     Gabapentin Other (See Comments)     Dysarthria and confusion  And leg edema. No Known Allergies     Penicillins     Rondec      Dizziness;  \"spinning\".      Past Medical History:   Diagnosis Date    Allergic rhinitis     Asthma     Chronic back pain     Hyperlipidemia     Hypertension     Liver disease     \"fatty liver\"    Mitral valve prolapse     Neuropathy     Obesity     Osteoarthritis     Type II or unspecified type diabetes mellitus without mention of complication, not stated as uncontrolled     Unspecified sleep apnea     Urinary incontinence      Patient Active Problem List   Diagnosis    Vitamin D deficiency    Allergic rhinitis    Snoring    Decreased hearing    Urinary incontinence    Vitamin B 12 deficiency    Menopause    Vaginal atrophy    Morbid obesity with BMI of 60.0-69.9, adult (HCC)    Asthma    Back pain    Sciatica of right side    Mixed hyperlipidemia    Type 2 diabetes mellitus without complication, without long-term current use of insulin (HCC)    Abnormal EKG    Osteoarthrosis involving multiple sites    Hypertension    Acute conjunctivitis of left eye     Past Surgical History:   Procedure Laterality Date    COLONOSCOPY      2004 Dr Maldonado [place] ARTHROSCOPY      left knee     Social History     Socioeconomic History    Marital status:      Spouse name: Not on file    Number of children: Not on file    Years of education: Not on file    Highest education level: Not on file   Occupational History    Not on file   Tobacco Use    Smoking status: Never Smokeless tobacco: Never   Substance and Sexual Activity    Alcohol use: No    Drug use: No    Sexual activity: Yes     Partners: Male   Other Topics Concern    Not on file   Social History Narrative    Not on file     Social Determinants of Health     Financial Resource Strain: Low Risk     Difficulty of Paying Living Expenses: Not hard at all   Food Insecurity: No Food Insecurity    Worried About Running Out of Food in the Last Year: Never true    Ran Out of Food in the Last Year: Never true   Transportation Needs: Not on file   Physical Activity: Not on file   Stress: Not on file   Social Connections: Not on file   Intimate Partner Violence: Not on file   Housing Stability: Not on file      Family History   Problem Relation Age of Onset    High Blood Pressure Mother     Heart Disease Mother     Stroke Mother     Asthma Mother     Arthritis Mother     High Blood Pressure Father     Arthritis Father     Cancer Sister         breast at 37. High Blood Pressure Sister     Heart Disease Sister     Cancer Brother         lung cancer and smoker. Mental Illness Brother         schizophrenia    Diabetes type 2  Brother     Heart Disease Brother         pacemaker    Diabetes Brother     Diabetes Brother         prediabetes    Diabetes type 2  Brother     Hypertension Sister        ROS  Review of Systems    PE:  Patient-Reported Vitals 4/26/2022   Patient-Reported Weight 334#   Patient-Reported Height 65.5\"   Patient-Reported Systolic -   Patient-Reported Diastolic -   Patient-Reported Pulse -   Patient-Reported Temperature 97.2   Patient-Reported SpO2 -      There were no vitals filed for this visit.      Constitutional: [x] Appears well-developed and well-nourished [x] No apparent distress      [] Abnormal-   Mental status  [x] Alert and awake  [x] Oriented to person/place/time [x]Able to follow commands      Eyes:  EOM    [x]  Normal  [] Abnormal-  Sclera  [x]  Normal  [] Abnormal -         Discharge []  None visible  [x] Abnormal -conjunctiva erythema    HENT:   [x] Normocephalic, atraumatic. [] Abnormal   [x] Mouth/Throat: Mucous membranes are moist.     External Ears [x] Normal  [] Abnormal-     Neck: [x] No visualized mass     Pulmonary/Chest: [x] Respiratory effort normal.  [x] No visualized signs of difficulty breathing or respiratory distress        [] Abnormal-      Musculoskeletal:   [] Normal gait with no signs of ataxia         [x] Normal range of motion of neck        [] Abnormal-       Neurological:        [x] No Facial Asymmetry (Cranial nerve 7 motor function) (limited exam to video visit)          [x] No gaze palsy        [] Abnormal-         Skin:        [x] No significant exanthematous lesions or discoloration noted on facial skin         [] Abnormal-            Psychiatric:       [x] Normal Affect [x] No Hallucinations        [] Abnormal-         January Kenney MD      Jose Antonio Tillman is a 66 y.o. female being evaluated by a Virtual Visit (video visit) encounter to address concerns as mentioned above. A caregiver was present when appropriate. Due to this being a TeleHealth encounter (During [facility]), evaluation of the following organ systems was limited: Vitals/Constitutional/EENT/Resp/CV/GI//MS/Neuro/Skin/Heme-Lymph-Imm. Pursuant to the emergency declaration under the [address] [place] [address], [address] [place] [address] authority and the [employer] Act, this Virtual Visit was conducted with patient's (and/or legal guardian's) consent, to reduce the patient's risk of exposure to COVID-19 and provide necessary medical care. The patient (and/or legal guardian) has also been advised to contact this office for worsening conditions or problems, and seek emergency medical treatment and/or call 911 if deemed necessary.      Patient identification was verified at the start of the visit: Yes    Total time spent on this encounter: not billed by time    Services were provided through a video synchronous discussion virtually to substitute for in-person clinic visit. Patient and provider were located at their individual homes. --Darren Ochoa MD on 9/21/2022 at 12:30 PM    An electronic signature was used to authenticate this note. This dictation was generated by voice recognition computer software. Although all attempts are made to edit the dictation for accuracy, there may be errors in the transcription that are not intended.

## 2022-09-21 NOTE — ASSESSMENT & PLAN NOTE
Given this is a vv unable to perform physical exam, and video quality is low and I am unable to see a good detailed eye exam.  History consistent with conjunctivitis, likely viral.  Has been persistent for 5 days. She does raise some concerns for sore? Lateral to her left eye but does not describe clear signs of cellulitis to concern for periorbital/orbital cellulitis.   We discussed alarm signs to be evaluated immediately in detail.   -Will send empiric topical antibiotics

## 2022-09-21 NOTE — TELEPHONE ENCOUNTER
Patient called in wanting to know if Dr. Mark Kelly was going to call any medication in for her eye infection. Pls call and advise.

## 2022-09-21 NOTE — TELEPHONE ENCOUNTER
Pt eyes are very red and swollen, has crust around them and they feel like a sore, they are droopy on the top. The left eye hurts. . like just bothers her     It started gradually and the past few days it has gotten worse.      She has tried a warm rag to out on them but it is not working   Does not know what to do       Please advise

## 2022-09-21 NOTE — ASSESSMENT & PLAN NOTE
Unclear control, will come in in person soon  -Continue amlodipine 10 mg and losartan 100 mg, HCTZ  -BMP  -Needs sleep study

## 2022-09-23 NOTE — TELEPHONE ENCOUNTER
Patient said she called the pharmacy and they cant process this it has to be sent to a local pharmacy     [facility] - Anup Garcia [phone] - F [phone]   [address]   Phone:  [phone]  Fax:  [phone]      Please send to this pharmacy      [facility] [place].  [facility]Corrinne Sorenson) [facility]         [id number], [address]   [address] [place] [id number]   Phone:  [phone]  Fax:  [phone]

## 2022-10-12 ENCOUNTER — TELEPHONE (OUTPATIENT)
Dept: INTERNAL MEDICINE CLINIC | Age: 72
End: 2022-10-12

## 2022-10-12 NOTE — TELEPHONE ENCOUNTER
Patient called, she would like an order for in home therapy. Insurance is [employer]. Having mobility problems with pain walking and moving. [employer]  Pre authorization fax # [phone]  Phone # [phone] - for release of authorization for home therapy.     Please call and advise

## 2022-10-13 DIAGNOSIS — R26.9 GAIT DISTURBANCE: Primary | ICD-10-CM

## 2022-10-28 DIAGNOSIS — I11.9 MALIGNANT HTN WITH HEART DISEASE, W/O CHF, W/O CHRONIC KIDNEY DISEASE: ICD-10-CM

## 2022-10-28 DIAGNOSIS — E78.2 MIXED HYPERLIPIDEMIA: ICD-10-CM

## 2022-10-28 DIAGNOSIS — J45.40 MODERATE PERSISTENT ASTHMA, UNSPECIFIED WHETHER COMPLICATED: ICD-10-CM

## 2022-10-28 RX ORDER — HYDROCHLOROTHIAZIDE 25 MG/1
TABLET ORAL
Qty: 90 TABLET | Refills: 0 | Status: SHIPPED | OUTPATIENT
Start: 2022-10-28

## 2022-10-28 RX ORDER — ROSUVASTATIN CALCIUM 40 MG/1
TABLET, COATED ORAL
Qty: 90 TABLET | Refills: 0 | Status: SHIPPED | OUTPATIENT
Start: 2022-10-28

## 2022-10-28 RX ORDER — AMLODIPINE BESYLATE 10 MG/1
10 TABLET ORAL DAILY
Qty: 90 TABLET | Refills: 0 | Status: SHIPPED | OUTPATIENT
Start: 2022-10-28 | End: 2022-11-27

## 2022-10-28 RX ORDER — LOSARTAN POTASSIUM 100 MG/1
100 TABLET ORAL DAILY
Qty: 90 TABLET | Refills: 0 | Status: SHIPPED | OUTPATIENT
Start: 2022-10-28

## 2022-10-28 RX ORDER — MONTELUKAST SODIUM 10 MG/1
10 TABLET ORAL DAILY
Qty: 90 TABLET | Refills: 0 | Status: SHIPPED | OUTPATIENT
Start: 2022-10-28

## 2022-11-17 ENCOUNTER — TELEPHONE (OUTPATIENT)
Dept: INTERNAL MEDICINE CLINIC | Age: 72
End: 2022-11-17

## 2022-11-17 NOTE — TELEPHONE ENCOUNTER
Pt states she was taking erythromycin but I am not finding it in her chart. She would like a refill.     [address], [address]

## 2022-11-17 NOTE — TELEPHONE ENCOUNTER
Erythromycin is an antibiotic and not a chronic medication that should be refilled.   Please remind her we ordered labs and she needs in person f/u

## 2022-11-22 NOTE — TELEPHONE ENCOUNTER
She was not seen in office for over 2 years, I have no idea how her blood pressure is doing, how her kidneys are doing, how her diabetes is doing. We cannot continue prescribing medications without knowing current BP/labs to make sure were not causing more harm. Can Medicare take care of a ride? Is there any family/friends that can help? Will not be able to safely continue prescribing medications without getting labs and see her in office at least once. Can do both at the same day.

## 2022-11-22 NOTE — TELEPHONE ENCOUNTER
[phone] (home)   Spoke with patient she stated that she can't get a ride here to see you @ this time or to have labs done,she can have  VV ! Called [facility] to see if they could go out , but they  only treat symptoms. Do she have anything besides getting labs done!

## 2022-11-29 ENCOUNTER — TELEPHONE (OUTPATIENT)
Dept: INTERNAL MEDICINE CLINIC | Age: 72
End: 2022-11-29

## 2022-11-29 NOTE — TELEPHONE ENCOUNTER
Patient complains of increasing fatigue, she would like a virtual visit with . Can the patient be worked into the schedule?

## 2022-11-29 NOTE — TELEPHONE ENCOUNTER
Patient needs in person visit and is overdue on labs.  I will not be able to help figure out what going on without examining her (have not seen in person) and getting labs (no labs since 2019)

## 2022-11-30 NOTE — TELEPHONE ENCOUNTER
Patient called in stating that she requesting a VV for a reason and she communicated why she needed a VV with Dr. Gt Singh and would like to know what Dr. Lisa Beltrán decision is on doing a VV with the patient.        Pls advise

## 2022-11-30 NOTE — TELEPHONE ENCOUNTER
[phone] (home)   STILL UNABLE TO REACH PATIENT   MAILBOX STILL  FULL. NO PERMISSION WAS GIVEN TO REACH OUT TO ANY FAMILY IN CHART   (HIPPA) / MESSAGE BELOW IS BEING MAILED TO PATIENT TODAY. PLEASE CALL OFFICE FOR APPT.      OFFICE:  [phone]

## 2022-12-31 DIAGNOSIS — J01.01 ACUTE RECURRENT MAXILLARY SINUSITIS: ICD-10-CM

## 2023-01-03 RX ORDER — FLUTICASONE PROPIONATE 50 MCG
SPRAY, SUSPENSION (ML) NASAL
Qty: 32 G | Refills: 5 | Status: SHIPPED | OUTPATIENT
Start: 2023-01-03

## 2023-03-27 DIAGNOSIS — E78.2 MIXED HYPERLIPIDEMIA: ICD-10-CM

## 2023-03-27 DIAGNOSIS — J45.40 MODERATE PERSISTENT ASTHMA, UNSPECIFIED WHETHER COMPLICATED: ICD-10-CM

## 2023-03-27 DIAGNOSIS — I11.9 MALIGNANT HTN WITH HEART DISEASE, W/O CHF, W/O CHRONIC KIDNEY DISEASE: ICD-10-CM

## 2023-03-27 RX ORDER — LOSARTAN POTASSIUM 100 MG/1
100 TABLET ORAL DAILY
Qty: 90 TABLET | Refills: 0 | OUTPATIENT
Start: 2023-03-27

## 2023-03-27 RX ORDER — MONTELUKAST SODIUM 10 MG/1
10 TABLET ORAL DAILY
Qty: 90 TABLET | Refills: 0 | OUTPATIENT
Start: 2023-03-27

## 2023-03-27 RX ORDER — ROSUVASTATIN CALCIUM 40 MG/1
TABLET, COATED ORAL
Qty: 90 TABLET | Refills: 0 | OUTPATIENT
Start: 2023-03-27

## 2023-03-27 RX ORDER — AMLODIPINE BESYLATE 10 MG/1
10 TABLET ORAL DAILY
Qty: 90 TABLET | Refills: 0 | OUTPATIENT
Start: 2023-03-27 | End: 2023-04-26

## 2023-03-27 RX ORDER — HYDROCHLOROTHIAZIDE 25 MG/1
TABLET ORAL
Qty: 90 TABLET | Refills: 0 | OUTPATIENT
Start: 2023-03-27

## 2023-03-28 DIAGNOSIS — I11.9 MALIGNANT HTN WITH HEART DISEASE, W/O CHF, W/O CHRONIC KIDNEY DISEASE: ICD-10-CM

## 2023-03-29 RX ORDER — HYDROCHLOROTHIAZIDE 25 MG/1
TABLET ORAL
Qty: 90 TABLET | Refills: 0 | Status: SHIPPED | OUTPATIENT
Start: 2023-03-29

## 2023-04-28 DIAGNOSIS — E78.2 MIXED HYPERLIPIDEMIA: ICD-10-CM

## 2023-04-28 DIAGNOSIS — J45.40 MODERATE PERSISTENT ASTHMA, UNSPECIFIED WHETHER COMPLICATED: ICD-10-CM

## 2023-04-28 DIAGNOSIS — I11.9 MALIGNANT HTN WITH HEART DISEASE, W/O CHF, W/O CHRONIC KIDNEY DISEASE: ICD-10-CM

## 2023-05-01 RX ORDER — LOSARTAN POTASSIUM 100 MG/1
100 TABLET ORAL DAILY
Qty: 30 TABLET | Refills: 0 | Status: SHIPPED | OUTPATIENT
Start: 2023-05-01

## 2023-05-01 RX ORDER — MONTELUKAST SODIUM 10 MG/1
10 TABLET ORAL DAILY
Qty: 30 TABLET | Refills: 0 | Status: SHIPPED | OUTPATIENT
Start: 2023-05-01

## 2023-05-01 RX ORDER — AMLODIPINE BESYLATE 10 MG/1
10 TABLET ORAL DAILY
Qty: 30 TABLET | Refills: 0 | Status: SHIPPED | OUTPATIENT
Start: 2023-05-01 | End: 2023-05-31

## 2023-05-01 RX ORDER — ROSUVASTATIN CALCIUM 40 MG/1
TABLET, COATED ORAL
Qty: 30 TABLET | Refills: 0 | Status: SHIPPED | OUTPATIENT
Start: 2023-05-01

## 2023-05-01 NOTE — TELEPHONE ENCOUNTER
I am giving her 30-day supply, until she can get labs done. Please call patient to explain I will not continue prescribing her any medications without seeing labs (no labs in years), as this might endanger her depending on kidney function and diabetes control.   She also needs in person visit to see how her blood pressure is doing, can help her set visit (okay if beyond 30 days, I will be okay with giving her refills until she sees me once labs are done, but will not refill again if no labs)

## 2023-10-23 DIAGNOSIS — J01.01 ACUTE RECURRENT MAXILLARY SINUSITIS: ICD-10-CM

## 2023-10-23 RX ORDER — FLUTICASONE PROPIONATE 50 MCG
SPRAY, SUSPENSION (ML) NASAL
Qty: 48 G | Refills: 3 | Status: SHIPPED | OUTPATIENT
Start: 2023-10-23

## 2023-10-23 NOTE — TELEPHONE ENCOUNTER
Roxy at [address] [place] [address] will have script canceled, pt needs an appt and she Tabatha Leal will make a note of that.